# Patient Record
Sex: FEMALE | Race: WHITE | NOT HISPANIC OR LATINO | Employment: OTHER | URBAN - METROPOLITAN AREA
[De-identification: names, ages, dates, MRNs, and addresses within clinical notes are randomized per-mention and may not be internally consistent; named-entity substitution may affect disease eponyms.]

---

## 2017-04-17 ENCOUNTER — OFFICE VISIT (OUTPATIENT)
Dept: CARDIOLOGY | Facility: CLINIC | Age: 72
End: 2017-04-17

## 2017-04-17 VITALS
HEIGHT: 64 IN | BODY MASS INDEX: 21.51 KG/M2 | DIASTOLIC BLOOD PRESSURE: 71 MMHG | WEIGHT: 126 LBS | SYSTOLIC BLOOD PRESSURE: 146 MMHG | HEART RATE: 59 BPM

## 2017-04-17 DIAGNOSIS — Z79.01 ANTICOAGULATED: ICD-10-CM

## 2017-04-17 DIAGNOSIS — E78.5 HYPERLIPIDEMIA, UNSPECIFIED HYPERLIPIDEMIA TYPE: ICD-10-CM

## 2017-04-17 DIAGNOSIS — I48.0 PAROXYSMAL ATRIAL FIBRILLATION (HCC): Primary | ICD-10-CM

## 2017-04-17 DIAGNOSIS — I10 ESSENTIAL HYPERTENSION: ICD-10-CM

## 2017-04-17 PROCEDURE — 99213 OFFICE O/P EST LOW 20 MIN: CPT | Performed by: INTERNAL MEDICINE

## 2017-04-17 PROCEDURE — 93000 ELECTROCARDIOGRAM COMPLETE: CPT | Performed by: INTERNAL MEDICINE

## 2017-04-17 RX ORDER — METOPROLOL TARTRATE 50 MG/1
50 TABLET, FILM COATED ORAL 2 TIMES DAILY
COMMUNITY
End: 2018-03-15 | Stop reason: SDUPTHER

## 2017-04-17 RX ORDER — PANTOPRAZOLE SODIUM 40 MG/1
40 TABLET, DELAYED RELEASE ORAL DAILY
COMMUNITY

## 2017-04-17 RX ORDER — APIXABAN 5 MG/1
5 TABLET, FILM COATED ORAL 2 TIMES DAILY
COMMUNITY
Start: 2017-03-18

## 2017-04-17 RX ORDER — QUETIAPINE FUMARATE 25 MG/1
25 TABLET, FILM COATED ORAL NIGHTLY
COMMUNITY
Start: 2017-04-16 | End: 2018-04-24 | Stop reason: ALTCHOICE

## 2017-04-17 RX ORDER — CHOLECALCIFEROL (VITAMIN D3) 125 MCG
5 CAPSULE ORAL NIGHTLY
COMMUNITY

## 2017-04-17 NOTE — PROGRESS NOTES
Subjective:       Radha Alfred is a 71 y.o. female who here for follow up    CC  Follow-up after the recent stroke  HPI  71 years old female with a past history of the paroxysmal atrial fibrillation, was taken off of the blood thinner for the various reasons, recently had a stroke has been doing well, also developed atrial fibrillation while the patient was in the hospital, also has a history of hyperlipidemia, hypertension and the diabetes, denies any chest pains or tightness in chest no heaviness with a pressure sensation with no syncopal near-syncopal episode     Problem List Items Addressed This Visit     None      Visit Diagnoses     Paroxysmal atrial fibrillation    -  Primary    Relevant Medications    metoprolol tartrate (LOPRESSOR) 50 MG tablet        Previous treatments/evaluations include: . Cardiac risk factors: .    The following portions of the patient's history were reviewed and updated as appropriate: allergies, current medications, past family history, past medical history, past social history, past surgical history and problem list.    Past Medical History:   Diagnosis Date   • Anxiety    • Cerebral infarction    • Chest pain    • Diabetes mellitus     TYPE II   • Edema of lower extremity    • GERD (gastroesophageal reflux disease)    • Hyperlipidemia    • Hypertension    • IBS (irritable bowel syndrome)    • MVP (mitral valve prolapse)    • PVD (peripheral vascular disease)    • Venous thromboembolism     reports that she has quit smoking. She does not have any smokeless tobacco history on file. She reports that she does not drink alcohol or use illicit drugs.  Family History   Problem Relation Age of Onset   • Diabetes Mother    • Heart disease Mother    • Hypertension Mother    • Hyperlipidemia Mother    • COPD Mother    • Heart disease Father    • Hyperlipidemia Father    • Hypertension Father    • Stroke Father    • Diabetes Father        Review of Systems  Constitutional: No wt loss, fever,  "fatigue  Gastrointestinal: No nausea, abdominal pain  Behavioral/Psych: No insomnia or anxiety   Cardiovascular no chest pains or tightness in chest  Objective:       Physical Exam           Physical Exam  /71  Pulse 59  Ht 64\" (162.6 cm)  Wt 126 lb (57.2 kg)  BMI 21.63 kg/m2    General appearance: NAD, conversant   Eyes: anicteric sclerae, moist conjunctivae; no lid-lag; PERRLA   HENT: Atraumatic; oropharynx clear with moist mucous membranes and no mucosal ulcerations;  normal hard and soft palate   Neck: Trachea midline; FROM, supple, no thyromegaly or lymphadenopathy   Lungs: CTA, with normal respiratory effort and no intercostal retractions   CV: S1-S2 regular, sys murmurs, no rub, no gallop   Abdomen: Soft, non-tender; no masses or HSM   Extremities: No peripheral edema or extremity lymphadenopathy  Skin: Normal temperature, turgor and texture; no rash, ulcers or subcutaneous nodules   Psych: Appropriate affect, alert and oriented to person, place and time           Cardiographics  @  ECG 12 Lead  Date/Time: 4/17/2017 3:51 PM  Performed by: CULLEN CHAMBERS  Authorized by: CULLEN CHAMBERS   Comparison: not compared with previous ECG   Previous ECG: no previous ECG available  Rhythm: sinus rhythm  Clinical impression: non-specific ECG            Echocardiogram:        Current Outpatient Prescriptions:   •  amLODIPine (NORVASC) 5 MG tablet, Take 5 mg by mouth., Disp: , Rfl:   •  atorvastatin (LIPITOR) 40 MG tablet, TAKE ONE TABLET BY MOUTH EVERY NIGHT AT BEDTIME, Disp: , Rfl:   •  Blood Glucose Monitoring Suppl (ACURA BLOOD GLUCOSE METER) W/DEVICE kit, 1 each., Disp: , Rfl:   •  cycloSPORINE (RESTASIS) 0.05 % ophthalmic emulsion, 1 drop., Disp: , Rfl:   •  ELIQUIS 5 MG tablet tablet, Take 5 mg by mouth 2 (Two) Times a Day., Disp: , Rfl:   •  hydroxychloroquine (PLAQUENIL) 200 MG tablet, TAKE ONE TABLET BY MOUTH TWICE A DAY, Disp: , Rfl:   •  loperamide (IMODIUM A-D) 2 MG tablet, Take 2 mg by " mouth 4 (four) times a day., Disp: , Rfl:   •  losartan (COZAAR) 50 MG tablet, Take 50 mg by mouth., Disp: , Rfl:   •  meclizine (ANTIVERT) 25 MG tablet, Take 25 mg by mouth 3 (three) times a day., Disp: , Rfl:   •  melatonin 5 MG tablet tablet, Take 5 mg by mouth Every Night., Disp: , Rfl:   •  metoprolol tartrate (LOPRESSOR) 50 MG tablet, Take 50 mg by mouth 2 (Two) Times a Day. 1/2 TABLET EVERY 12 HOURS, Disp: , Rfl:   •  pantoprazole (PROTONIX) 40 MG EC tablet, Take 40 mg by mouth Daily., Disp: , Rfl:   •  promethazine (PHENERGAN) 25 MG tablet, TAKE ONE TABLET BY MOUTH EVERY 6 HOURS AS NEEDED, Disp: , Rfl:   •  Vortioxetine HBr 10 MG tablet, Take 10 mg by mouth., Disp: , Rfl:   •  ALPRAZolam (XANAX) 1 MG tablet, Take 1 mg by mouth daily., Disp: , Rfl:   •  cetirizine (ZyrTEC) 10 MG tablet, Take 10 mg by mouth., Disp: , Rfl:   •  fentaNYL (DURAGESIC) 75 MCG/HR patch, Place  on the skin., Disp: , Rfl:   •  glycopyrrolate (ROBINUL) 1 MG tablet, Take 1 mg by mouth., Disp: , Rfl:   •  lansoprazole (PREVACID) 30 MG capsule, TAKE ONE CAPSULE BY MOUTH DAILY, Disp: , Rfl:   •  oxyCODONE-acetaminophen (PERCOCET)  MG per tablet, Take 1-2 tablets by mouth., Disp: , Rfl:   •  QUEtiapine (SEROquel) 25 MG tablet, Take 25 mg by mouth Every Night. 1/2 PILL AT BEDTIME, Disp: , Rfl:    Assessment:        Patient Active Problem List   Diagnosis   • Altered mental status   • Traumatic amputation of toe   • Anemia   • Anxiety   • Edema of lower extremity   • Bunion   • Cerebral infarction   • Chest pain   • Depression   • Diabetes mellitus associated with genetic syndrome   • Type 2 diabetes mellitus   • Encounter for screening for diabetes mellitus   • Hypertension   • Disorder of foot   • History of cardiac catheterization   • History of surgical procedure   • Displacement of lumbar intervertebral disc without myelopathy   • Heparin-induced thrombocytopenia   • Peripheral vascular disease   • Spinal stenosis   • Urinary tract  bacterial infections   • Small bowel obstruction   • History of cholecystectomy   • Postprocedural state   • Rheumatoid arthritis   • Incisional hernia   • Pain   • Need for vaccination   • Polypharmacy   • Disorder of lipid metabolism   • Memory loss   • Hyperlipidemia         IMPRESSION OF HEART CATHETERIZATION:    1. Normal left main coronary artery.  2. Normal left anterior descending artery and its branches with diagonal   branch 50% ostial  3. Normal left circumflex artery and its branches.  4. Normal right coronary artery.  5. Normal left ventricular systolic function.  LVEDP 30 mmHg.  Ejection   fraction 60 %.    RECOMMENDATION:  Medical management.      Plan:            ICD-10-CM ICD-9-CM   1. Paroxysmal atrial fibrillation I48.0 427.31     1. Paroxysmal atrial fibrillation  At this point patient in normal sinus rhythm, had a stroke couple months ago secondary to atrial fibrillation  - ECG 12 Lead    2. Essential hypertension  Importance of controlling hypertension and blood pressure  checkup on the regular basis has been explained  Hypertension as a silent killer has been discussed  Risk reduction of the weight and regular exercises to control the hypertension has been explained      3. Hyperlipidemia, unspecified hyperlipidemia type  Risk of the hyperlipidemia, importance of the treatment has been explained    Pros and cons of the statins has been explained    Regular blood workup as well as side effects including the liver failure, myelopathy death has been explained          4. Anticoagulated  Pros and cons as well as indication of the anticoagulation has been explained to the patient in detail    There are no obvious complications at this stage    Risk of  the bleedings has been explained    Need for the regular blood workup and adjust the dose has been explained    Need for proper follow-up on anticoagulation also has been explained    RECENT STROKE  WILL CONSIDER WATCHMAN  CON  ELIQUISE    COUNSELING:    Radha Delgado was given to patient for the following topics: diagnostic results, risk factor reductions, impressions, risks and benefits of treatment options and importance of treatment compliance .       SMOKING COUNSELING:    Counseling given: Not Answered      EMR Dragon/Transcription disclaimer:   Much of this encounter note is an electronic transcription/translation of spoken language to printed text. The electronic translation of spoken language may permit erroneous, or at times, nonsensical words or phrases to be inadvertently transcribed; Although I have reviewed the note for such errors, some may still exist.

## 2017-04-20 PROBLEM — Z79.01 ANTICOAGULATED: Status: ACTIVE | Noted: 2017-04-20

## 2018-03-15 ENCOUNTER — OFFICE VISIT (OUTPATIENT)
Dept: CARDIOLOGY | Facility: CLINIC | Age: 73
End: 2018-03-15

## 2018-03-15 VITALS
DIASTOLIC BLOOD PRESSURE: 74 MMHG | HEART RATE: 71 BPM | SYSTOLIC BLOOD PRESSURE: 147 MMHG | WEIGHT: 142.2 LBS | HEIGHT: 65 IN | BODY MASS INDEX: 23.69 KG/M2

## 2018-03-15 DIAGNOSIS — E78.5 HYPERLIPIDEMIA, UNSPECIFIED HYPERLIPIDEMIA TYPE: ICD-10-CM

## 2018-03-15 DIAGNOSIS — I73.9 PERIPHERAL VASCULAR DISEASE (HCC): ICD-10-CM

## 2018-03-15 DIAGNOSIS — R09.89 OTHER SPECIFIED SYMPTOMS AND SIGNS INVOLVING THE CIRCULATORY AND RESPIRATORY SYSTEMS: ICD-10-CM

## 2018-03-15 DIAGNOSIS — I77.9 CAROTID ARTERY DISEASE, UNSPECIFIED LATERALITY (HCC): Primary | ICD-10-CM

## 2018-03-15 DIAGNOSIS — R07.2 PRECORDIAL PAIN: ICD-10-CM

## 2018-03-15 DIAGNOSIS — I10 ESSENTIAL HYPERTENSION: ICD-10-CM

## 2018-03-15 PROCEDURE — 99213 OFFICE O/P EST LOW 20 MIN: CPT | Performed by: INTERNAL MEDICINE

## 2018-03-15 PROCEDURE — 93000 ELECTROCARDIOGRAM COMPLETE: CPT | Performed by: INTERNAL MEDICINE

## 2018-03-15 NOTE — PROGRESS NOTES
Subjective:       Radha Alfred is a 72 y.o. female who here for follow up    CC  BLURRED EYE    HPI  72-year-old female with known history of benign essential arterial hypertension, peripheral vascular disease hyperlipidemia and coronary artery disease here for the follow-up has been complaining of the blurred vision off-and-on mild to moderate in intensity, no chest pains or tightness in chest no heaviness with a pressure sensation     Problem List Items Addressed This Visit        Cardiovascular and Mediastinum    Hypertension    Peripheral vascular disease    Hyperlipidemia       Nervous and Auditory    Chest pain    Relevant Orders    Stress Test With Myocardial Perfusion One Day    Adult Transthoracic Echo Complete W/ Cont if Necessary Per Protocol      Other Visit Diagnoses     Carotid artery disease, unspecified laterality    -  Primary    Relevant Orders    Stress Test With Myocardial Perfusion One Day    Adult Transthoracic Echo Complete W/ Cont if Necessary Per Protocol    Duplex Carotid Ultrasound CAR    Other specified symptoms and signs involving the circulatory and respiratory systems         Relevant Orders    Duplex Carotid Ultrasound CAR        .    The following portions of the patient's history were reviewed and updated as appropriate: allergies, current medications, past family history, past medical history, past social history, past surgical history and problem list.    Past Medical History:   Diagnosis Date   • Anxiety    • Cerebral infarction    • Chest pain    • Diabetes mellitus     TYPE II   • Edema of lower extremity    • GERD (gastroesophageal reflux disease)    • Hyperlipidemia    • Hypertension    • IBS (irritable bowel syndrome)    • MVP (mitral valve prolapse)    • PVD (peripheral vascular disease)    • Venous thromboembolism     reports that she has quit smoking. She has never used smokeless tobacco. She reports that she does not drink alcohol or use drugs.  Family History  "  Problem Relation Age of Onset   • Diabetes Mother    • Heart disease Mother    • Hypertension Mother    • Hyperlipidemia Mother    • COPD Mother    • Heart disease Father    • Hyperlipidemia Father    • Hypertension Father    • Stroke Father    • Diabetes Father        Review of Systems  Constitutional: No wt loss, fever, fatigue  Gastrointestinal: No nausea, abdominal pain  Behavioral/Psych: No insomnia or anxiety   Cardiovascular No chest pains or tightness in chest  Objective:       Physical Exam           Physical Exam  /74   Pulse 71   Ht 165.1 cm (65\")   Wt 64.5 kg (142 lb 3.2 oz)   BMI 23.66 kg/m²     General appearance: NAD, conversant   Eyes: anicteric sclerae, moist conjunctivae; no lid-lag; PERRLA   HENT: Atraumatic; oropharynx clear with moist mucous membranes and no mucosal ulcerations;  normal hard and soft palate   Neck: Trachea midline; FROM, supple, no thyromegaly or lymphadenopathy   Lungs: CTA, with normal respiratory effort and no intercostal retractions   CV: S1-S2 regular, no murmurs, no rub, no gallop   Abdomen: Soft, non-tender; no masses or HSM   Extremities: No peripheral edema or extremity lymphadenopathy  Skin: Normal temperature, turgor and texture; no rash, ulcers or subcutaneous nodules   Psych: Appropriate affect, alert and oriented to person, place and time           Cardiographics  @  ECG 12 Lead  Date/Time: 3/15/2018 1:06 PM  Performed by: CULLEN CHAMBERS  Authorized by: CULLEN CHAMBERS   Comparison: compared with previous ECG   Similar to previous ECG  Rhythm: sinus rhythm  ST Flattening: all  Clinical impression: non-specific ECG            Echocardiogram:        Current Outpatient Prescriptions:   •  amLODIPine (NORVASC) 5 MG tablet, Take 5 mg by mouth., Disp: , Rfl:   •  atorvastatin (LIPITOR) 40 MG tablet, TAKE ONE TABLET BY MOUTH EVERY NIGHT AT BEDTIME, Disp: , Rfl:   •  cycloSPORINE (RESTASIS) 0.05 % ophthalmic emulsion, 1 drop., Disp: , Rfl:   •  " ELIQUIS 5 MG tablet tablet, Take 5 mg by mouth 2 (Two) Times a Day., Disp: , Rfl:   •  hydroxychloroquine (PLAQUENIL) 200 MG tablet, TAKE ONE TABLET BY MOUTH TWICE A DAY, Disp: , Rfl:   •  loperamide (IMODIUM A-D) 2 MG tablet, Take 2 mg by mouth 4 (four) times a day., Disp: , Rfl:   •  losartan (COZAAR) 50 MG tablet, Take 50 mg by mouth., Disp: , Rfl:   •  melatonin 5 MG tablet tablet, Take 5 mg by mouth Every Night., Disp: , Rfl:   •  metoprolol tartrate (LOPRESSOR) 25 MG tablet, Take 1 tablet by mouth 2 (Two) Times a Day., Disp: 60 tablet, Rfl: 11  •  pantoprazole (PROTONIX) 40 MG EC tablet, Take 40 mg by mouth Daily., Disp: , Rfl:   •  QUEtiapine (SEROquel) 25 MG tablet, Take 25 mg by mouth Every Night. 1/2 PILL AT BEDTIME, Disp: , Rfl:   •  Vortioxetine HBr 10 MG tablet, Take 10 mg by mouth., Disp: , Rfl:   •  Blood Glucose Monitoring Suppl (ACURA BLOOD GLUCOSE METER) W/DEVICE kit, 1 each., Disp: , Rfl:   •  glycopyrrolate (ROBINUL) 1 MG tablet, Take 1 mg by mouth., Disp: , Rfl:    Assessment:        Patient Active Problem List   Diagnosis   • Altered mental status   • Traumatic amputation of toe   • Anemia   • Anxiety   • Edema of lower extremity   • Bunion   • Cerebral infarction   • Chest pain   • Depression   • Diabetes mellitus associated with genetic syndrome   • Type 2 diabetes mellitus   • Encounter for screening for diabetes mellitus   • Hypertension   • Disorder of foot   • History of cardiac catheterization   • History of surgical procedure   • Displacement of lumbar intervertebral disc without myelopathy   • Heparin-induced thrombocytopenia   • Peripheral vascular disease   • Spinal stenosis   • Urinary tract bacterial infections   • Small bowel obstruction   • History of cholecystectomy   • Postprocedural state   • Rheumatoid arthritis   • Incisional hernia   • Pain   • Need for vaccination   • Polypharmacy   • Disorder of lipid metabolism   • Memory loss   • Hyperlipidemia   • Anticoagulated          CHOL 133      Plan:            ICD-10-CM ICD-9-CM   1. Carotid artery disease, unspecified laterality I77.9 447.9   2. Precordial pain  R07.2 786.51   3. Other specified symptoms and signs involving the circulatory and respiratory systems  R09.89 785.9   4. Essential hypertension I10 401.9   5. Peripheral vascular disease I73.9 443.9   6. Hyperlipidemia, unspecified hyperlipidemia type E78.5 272.4     1. Carotid artery disease, unspecified laterality    - Stress Test With Myocardial Perfusion One Day  - Adult Transthoracic Echo Complete W/ Cont if Necessary Per Protocol  - Duplex Carotid Ultrasound CAR    2. Precordial pain   Considering the patient's symptoms as well as clinical situation and  EKG findings, along with cardiac risk factors, ischemic workup is necessary to rule out ischemic cardiomyopathy, stress induced arrhythmias, and functional capacity for diagnosis as well as prognostic consideration    - Stress Test With Myocardial Perfusion One Day  - Adult Transthoracic Echo Complete W/ Cont if Necessary Per Protocol    3. Other specified symptoms and signs involving the circulatory and respiratory systems   Considering patient's medical condition as well as the risk factors, patient will require echocardiogram for further evaluation for the LV function, four-chamber evaluation, including the pressures, valvular function and  pericardial disease and pericardial effusion    - Duplex Carotid Ultrasound CAR    4. Essential hypertension  The blood pressure borderline high    5. Peripheral vascular disease  Occasional leg pains    6. Hyperlipidemia, unspecified hyperlipidemia type       LEXISCAN/ ECHO/ US OF CAROTIDS    COUNSELING:    Radha Delgado was given to patient for the following topics: diagnostic results, risk factor reductions, impressions, risks and benefits of treatment options and importance of treatment compliance .       SMOKING COUNSELING:    Counseling given: Not Answered      EMR  Dragon/Transcription disclaimer:   Much of this encounter note is an electronic transcription/translation of spoken language to printed text. The electronic translation of spoken language may permit erroneous, or at times, nonsensical words or phrases to be inadvertently transcribed; Although I have reviewed the note for such errors, some may still exist.

## 2018-03-28 ENCOUNTER — APPOINTMENT (OUTPATIENT)
Dept: CARDIOLOGY | Facility: HOSPITAL | Age: 73
End: 2018-03-28
Attending: INTERNAL MEDICINE

## 2018-03-30 ENCOUNTER — APPOINTMENT (OUTPATIENT)
Dept: CARDIOLOGY | Facility: HOSPITAL | Age: 73
End: 2018-03-30
Attending: INTERNAL MEDICINE

## 2018-04-09 ENCOUNTER — HOSPITAL ENCOUNTER (OUTPATIENT)
Dept: CARDIOLOGY | Facility: HOSPITAL | Age: 73
Discharge: HOME | End: 2018-04-09
Attending: INTERNAL MEDICINE | Admitting: INTERNAL MEDICINE

## 2018-04-09 ENCOUNTER — HOSPITAL ENCOUNTER (OUTPATIENT)
Dept: CARDIOLOGY | Facility: HOSPITAL | Age: 73
Discharge: HOME | End: 2018-04-09
Attending: INTERNAL MEDICINE

## 2018-04-09 LAB
BH CV XLRA MEAS LEFT DIST CCA EDV: 13 CM/SEC
BH CV XLRA MEAS LEFT DIST CCA PSV: 56.4 CM/SEC
BH CV XLRA MEAS LEFT DIST ICA EDV: -15.8 CM/SEC
BH CV XLRA MEAS LEFT DIST ICA PSV: -104 CM/SEC
BH CV XLRA MEAS LEFT MID ICA EDV: 29.2 CM/SEC
BH CV XLRA MEAS LEFT MID ICA PSV: 111.1 CM/SEC
BH CV XLRA MEAS LEFT PROX CCA EDV: 9.3 CM/SEC
BH CV XLRA MEAS LEFT PROX CCA PSV: 52.9 CM/SEC
BH CV XLRA MEAS LEFT PROX ECA EDV: 10.9 CM/SEC
BH CV XLRA MEAS LEFT PROX ECA PSV: 117.1 CM/SEC
BH CV XLRA MEAS LEFT PROX ICA EDV: 15 CM/SEC
BH CV XLRA MEAS LEFT PROX ICA PSV: 81 CM/SEC
BH CV XLRA MEAS LEFT PROX SCLA PSV: 107.1 CM/SEC
BH CV XLRA MEAS LEFT VERTEBRAL A EDV: 9.9 CM/SEC
BH CV XLRA MEAS LEFT VERTEBRAL A PSV: 65.2 CM/SEC
BH CV XLRA MEAS RIGHT DIST CCA EDV: 16.6 CM/SEC
BH CV XLRA MEAS RIGHT DIST CCA PSV: 64.5 CM/SEC
BH CV XLRA MEAS RIGHT DIST ICA EDV: -18.9 CM/SEC
BH CV XLRA MEAS RIGHT DIST ICA PSV: -95.7 CM/SEC
BH CV XLRA MEAS RIGHT MID ICA EDV: -26.5 CM/SEC
BH CV XLRA MEAS RIGHT MID ICA PSV: -109 CM/SEC
BH CV XLRA MEAS RIGHT PROX CCA EDV: 11.1 CM/SEC
BH CV XLRA MEAS RIGHT PROX CCA PSV: 70.8 CM/SEC
BH CV XLRA MEAS RIGHT PROX ECA EDV: 11.1 CM/SEC
BH CV XLRA MEAS RIGHT PROX ECA PSV: 125.1 CM/SEC
BH CV XLRA MEAS RIGHT PROX ICA EDV: 16.6 CM/SEC
BH CV XLRA MEAS RIGHT PROX ICA PSV: 77.1 CM/SEC
BH CV XLRA MEAS RIGHT PROX SCLA PSV: 143.1 CM/SEC
BH CV XLRA MEAS RIGHT VERTEBRAL A EDV: -14.2 CM/SEC
BH CV XLRA MEAS RIGHT VERTEBRAL A PSV: -78.7 CM/SEC
LEFT ARM BP: NORMAL MMHG
RIGHT ARM BP: NORMAL MMHG

## 2018-04-09 PROCEDURE — 93880 EXTRACRANIAL BILAT STUDY: CPT

## 2018-04-09 PROCEDURE — 93306 TTE W/DOPPLER COMPLETE: CPT | Performed by: INTERNAL MEDICINE

## 2018-04-09 PROCEDURE — 93306 TTE W/DOPPLER COMPLETE: CPT

## 2018-04-10 LAB
AORTIC DIMENSIONLESS INDEX: 0.8 (DI)
BH CV ECHO MEAS - ACS: 1.6 CM
BH CV ECHO MEAS - AO MAX PG (FULL): 3.4 MMHG
BH CV ECHO MEAS - AO MAX PG: 6.7 MMHG
BH CV ECHO MEAS - AO MEAN PG (FULL): 1 MMHG
BH CV ECHO MEAS - AO MEAN PG: 3 MMHG
BH CV ECHO MEAS - AO ROOT AREA (BSA CORRECTED): 1.6
BH CV ECHO MEAS - AO ROOT AREA: 6.2 CM^2
BH CV ECHO MEAS - AO ROOT DIAM: 2.8 CM
BH CV ECHO MEAS - AO V2 MAX: 129 CM/SEC
BH CV ECHO MEAS - AO V2 MEAN: 85.3 CM/SEC
BH CV ECHO MEAS - AO V2 VTI: 27.6 CM
BH CV ECHO MEAS - AVA(I,A): 2.2 CM^2
BH CV ECHO MEAS - AVA(I,D): 2.2 CM^2
BH CV ECHO MEAS - AVA(V,A): 2 CM^2
BH CV ECHO MEAS - AVA(V,D): 2 CM^2
BH CV ECHO MEAS - BSA(HAYCOCK): 1.7 M^2
BH CV ECHO MEAS - BSA: 1.7 M^2
BH CV ECHO MEAS - BZI_BMI: 23.6 KILOGRAMS/M^2
BH CV ECHO MEAS - BZI_METRIC_HEIGHT: 165.1 CM
BH CV ECHO MEAS - BZI_METRIC_WEIGHT: 64.4 KG
BH CV ECHO MEAS - CONTRAST EF (2CH): 62.9 ML/M^2
BH CV ECHO MEAS - CONTRAST EF 4CH: 66 ML/M^2
BH CV ECHO MEAS - EDV(CUBED): 68.9 ML
BH CV ECHO MEAS - EDV(MOD-SP2): 62 ML
BH CV ECHO MEAS - EDV(MOD-SP4): 50 ML
BH CV ECHO MEAS - EDV(TEICH): 74.2 ML
BH CV ECHO MEAS - EF(CUBED): 74.5 %
BH CV ECHO MEAS - EF(MOD-SP2): 62.9 %
BH CV ECHO MEAS - EF(MOD-SP4): 66 %
BH CV ECHO MEAS - EF(TEICH): 66.8 %
BH CV ECHO MEAS - ESV(CUBED): 17.6 ML
BH CV ECHO MEAS - ESV(MOD-SP2): 23 ML
BH CV ECHO MEAS - ESV(MOD-SP4): 17 ML
BH CV ECHO MEAS - ESV(TEICH): 24.6 ML
BH CV ECHO MEAS - FS: 36.6 %
BH CV ECHO MEAS - IVS/LVPW: 1.2
BH CV ECHO MEAS - IVSD: 1.4 CM
BH CV ECHO MEAS - LAT PEAK E' VEL: 8 CM/SEC
BH CV ECHO MEAS - LV DIASTOLIC VOL/BSA (35-75): 29.2 ML/M^2
BH CV ECHO MEAS - LV MASS(C)D: 193.5 GRAMS
BH CV ECHO MEAS - LV MASS(C)DI: 113.1 GRAMS/M^2
BH CV ECHO MEAS - LV MAX PG: 3.3 MMHG
BH CV ECHO MEAS - LV MEAN PG: 2 MMHG
BH CV ECHO MEAS - LV SYSTOLIC VOL/BSA (12-30): 9.9 ML/M^2
BH CV ECHO MEAS - LV V1 MAX: 90.2 CM/SEC
BH CV ECHO MEAS - LV V1 MEAN: 59.2 CM/SEC
BH CV ECHO MEAS - LV V1 VTI: 21 CM
BH CV ECHO MEAS - LVIDD: 4.1 CM
BH CV ECHO MEAS - LVIDS: 2.6 CM
BH CV ECHO MEAS - LVLD AP2: 6.1 CM
BH CV ECHO MEAS - LVLD AP4: 6.7 CM
BH CV ECHO MEAS - LVLS AP2: 5.1 CM
BH CV ECHO MEAS - LVLS AP4: 5.6 CM
BH CV ECHO MEAS - LVOT AREA (M): 2.8 CM^2
BH CV ECHO MEAS - LVOT AREA: 2.8 CM^2
BH CV ECHO MEAS - LVOT DIAM: 1.9 CM
BH CV ECHO MEAS - LVPWD: 1.2 CM
BH CV ECHO MEAS - MED PEAK E' VEL: 8 CM/SEC
BH CV ECHO MEAS - MR MAX PG: 100.8 MMHG
BH CV ECHO MEAS - MR MAX VEL: 502 CM/SEC
BH CV ECHO MEAS - MV A DUR: 1.9 SEC
BH CV ECHO MEAS - MV A MAX VEL: 47.1 CM/SEC
BH CV ECHO MEAS - MV DEC SLOPE: 1110 CM/SEC^2
BH CV ECHO MEAS - MV DEC TIME: 0.12 SEC
BH CV ECHO MEAS - MV E MAX VEL: 132 CM/SEC
BH CV ECHO MEAS - MV E/A: 2.8
BH CV ECHO MEAS - MV MAX PG: 7.8 MMHG
BH CV ECHO MEAS - MV MEAN PG: 2 MMHG
BH CV ECHO MEAS - MV P1/2T MAX VEL: 149 CM/SEC
BH CV ECHO MEAS - MV P1/2T: 39.3 MSEC
BH CV ECHO MEAS - MV V2 MAX: 140 CM/SEC
BH CV ECHO MEAS - MV V2 MEAN: 66.5 CM/SEC
BH CV ECHO MEAS - MV V2 VTI: 25.9 CM
BH CV ECHO MEAS - MVA P1/2T LCG: 1.5 CM^2
BH CV ECHO MEAS - MVA(P1/2T): 5.6 CM^2
BH CV ECHO MEAS - MVA(VTI): 2.3 CM^2
BH CV ECHO MEAS - PA ACC TIME: 0.13 SEC
BH CV ECHO MEAS - PA MAX PG (FULL): 0.42 MMHG
BH CV ECHO MEAS - PA MAX PG: 3.2 MMHG
BH CV ECHO MEAS - PA PR(ACCEL): 21 MMHG
BH CV ECHO MEAS - PA V2 MAX: 89.6 CM/SEC
BH CV ECHO MEAS - PULM A REVS DUR: 0.5 SEC
BH CV ECHO MEAS - PULM A REVS VEL: 27.6 CM/SEC
BH CV ECHO MEAS - PULM DIAS VEL: 60.6 CM/SEC
BH CV ECHO MEAS - PULM S/D: 0.37
BH CV ECHO MEAS - PULM SYS VEL: 22.3 CM/SEC
BH CV ECHO MEAS - PVA(V,A): 2.4 CM^2
BH CV ECHO MEAS - PVA(V,D): 2.4 CM^2
BH CV ECHO MEAS - QP/QS: 0.91
BH CV ECHO MEAS - RAP SYSTOLE: 8 MMHG
BH CV ECHO MEAS - RV MAX PG: 2.8 MMHG
BH CV ECHO MEAS - RV MEAN PG: 2 MMHG
BH CV ECHO MEAS - RV V1 MAX: 83.5 CM/SEC
BH CV ECHO MEAS - RV V1 MEAN: 64.6 CM/SEC
BH CV ECHO MEAS - RV V1 VTI: 21.3 CM
BH CV ECHO MEAS - RVOT AREA: 2.5 CM^2
BH CV ECHO MEAS - RVOT DIAM: 1.8 CM
BH CV ECHO MEAS - RVSP: 50 MMHG
BH CV ECHO MEAS - SI(AO): 99.4 ML/M^2
BH CV ECHO MEAS - SI(CUBED): 30 ML/M^2
BH CV ECHO MEAS - SI(LVOT): 34.8 ML/M^2
BH CV ECHO MEAS - SI(MOD-SP2): 22.8 ML/M^2
BH CV ECHO MEAS - SI(MOD-SP4): 19.3 ML/M^2
BH CV ECHO MEAS - SI(TEICH): 29 ML/M^2
BH CV ECHO MEAS - SV(AO): 169.9 ML
BH CV ECHO MEAS - SV(CUBED): 51.3 ML
BH CV ECHO MEAS - SV(LVOT): 59.5 ML
BH CV ECHO MEAS - SV(MOD-SP2): 39 ML
BH CV ECHO MEAS - SV(MOD-SP4): 33 ML
BH CV ECHO MEAS - SV(RVOT): 54.2 ML
BH CV ECHO MEAS - SV(TEICH): 49.6 ML
BH CV ECHO MEAS - TAPSE (>1.6): 2.2 CM2
BH CV ECHO MEAS - TR MAX VEL: 323 CM/SEC
BH CV VAS BP LEFT ARM: NORMAL MMHG
BH CV XLRA - RV BASE: 3.8 CM
BH CV XLRA - TDI S': 16 CM/SEC
E/E' RATIO: 16.5
LEFT ATRIUM VOLUME INDEX: 31.8 ML/M2
MAXIMAL PREDICTED HEART RATE: 147 BPM
STRESS TARGET HR: 125 BPM

## 2018-04-16 ENCOUNTER — OFFICE VISIT (OUTPATIENT)
Dept: CARDIOLOGY | Facility: CLINIC | Age: 73
End: 2018-04-16

## 2018-04-16 VITALS
BODY MASS INDEX: 25.16 KG/M2 | SYSTOLIC BLOOD PRESSURE: 148 MMHG | WEIGHT: 142 LBS | HEART RATE: 60 BPM | HEIGHT: 63 IN | OXYGEN SATURATION: 96 % | DIASTOLIC BLOOD PRESSURE: 65 MMHG

## 2018-04-16 DIAGNOSIS — R06.09 OTHER FORM OF DYSPNEA: ICD-10-CM

## 2018-04-16 DIAGNOSIS — R93.1 ABNORMAL ECHOCARDIOGRAM: Primary | ICD-10-CM

## 2018-04-16 DIAGNOSIS — E78.5 HYPERLIPIDEMIA, UNSPECIFIED HYPERLIPIDEMIA TYPE: ICD-10-CM

## 2018-04-16 DIAGNOSIS — I10 ESSENTIAL HYPERTENSION: ICD-10-CM

## 2018-04-16 PROCEDURE — 99213 OFFICE O/P EST LOW 20 MIN: CPT | Performed by: INTERNAL MEDICINE

## 2018-04-16 NOTE — PROGRESS NOTES
Subjective:       Radha Alfred is a 72 y.o. female who here for follow up    CC  Follow-up for the hypertension hyperlipidemia  HPI  72-year-old female with known history of benign essential arterial hypertension hyperlipidemia and abnormal EKG here for the follow-up  No chest pains or tightness in chest shortness of breath on exertion no different than before     Problem List Items Addressed This Visit        Cardiovascular and Mediastinum    Hypertension    Hyperlipidemia      Other Visit Diagnoses     Abnormal echocardiogram    -  Primary    Relevant Orders    Stress Test With Myocardial Perfusion One Day (Completed)    Other form of dyspnea         Relevant Orders    Stress Test With Myocardial Perfusion One Day (Completed)        .    The following portions of the patient's history were reviewed and updated as appropriate: allergies, current medications, past family history, past medical history, past social history, past surgical history and problem list.    Past Medical History:   Diagnosis Date   • Anxiety    • Cerebral infarction    • Chest pain    • Diabetes mellitus     TYPE II   • Edema of lower extremity    • GERD (gastroesophageal reflux disease)    • Hyperlipidemia    • Hypertension    • IBS (irritable bowel syndrome)    • MVP (mitral valve prolapse)    • PVD (peripheral vascular disease)    • Venous thromboembolism     reports that she has quit smoking. She has never used smokeless tobacco. She reports that she does not drink alcohol or use drugs.  Family History   Problem Relation Age of Onset   • Diabetes Mother    • Heart disease Mother    • Hypertension Mother    • Hyperlipidemia Mother    • COPD Mother    • Heart disease Father    • Hyperlipidemia Father    • Hypertension Father    • Stroke Father    • Diabetes Father        Review of Systems  Constitutional: No wt loss, fever, fatigue  Gastrointestinal: No nausea, abdominal pain  Behavioral/Psych: No insomnia or anxiety   Cardiovascular No  "chest pain  Objective:       Physical Exam           Physical Exam  /65   Pulse 60   Ht 160 cm (63\")   Wt 64.4 kg (142 lb)   SpO2 96%   BMI 25.15 kg/m²     General appearance: NAD, conversant   Eyes: anicteric sclerae, moist conjunctivae; no lid-lag; PERRLA   HENT: Atraumatic; oropharynx clear with moist mucous membranes and no mucosal ulcerations;  normal hard and soft palate   Neck: Trachea midline; FROM, supple, no thyromegaly or lymphadenopathy   Lungs: CTA, with normal respiratory effort and no intercostal retractions   CV: S1-S2 regular, no murmurs, no rub, no gallop   Abdomen: Soft, non-tender; no masses or HSM   Extremities: No peripheral edema or extremity lymphadenopathy  Skin: Normal temperature, turgor and texture; no rash, ulcers or subcutaneous nodules   Psych: Appropriate affect, alert and oriented to person, place and time           Cardiographics  @Procedures    Echocardiogram:        Current Outpatient Prescriptions:   •  amLODIPine (NORVASC) 5 MG tablet, Take 5 mg by mouth., Disp: , Rfl:   •  atorvastatin (LIPITOR) 40 MG tablet, TAKE ONE TABLET BY MOUTH EVERY NIGHT AT BEDTIME, Disp: , Rfl:   •  Blood Glucose Monitoring Suppl (ACURA BLOOD GLUCOSE METER) W/DEVICE kit, 1 each., Disp: , Rfl:   •  cycloSPORINE (RESTASIS) 0.05 % ophthalmic emulsion, 1 drop., Disp: , Rfl:   •  ELIQUIS 5 MG tablet tablet, Take 5 mg by mouth 2 (Two) Times a Day., Disp: , Rfl:   •  glycopyrrolate (ROBINUL) 1 MG tablet, Take 1 mg by mouth., Disp: , Rfl:   •  hydroxychloroquine (PLAQUENIL) 200 MG tablet, TAKE ONE TABLET BY MOUTH TWICE A DAY, Disp: , Rfl:   •  loperamide (IMODIUM A-D) 2 MG tablet, Take 2 mg by mouth 4 (four) times a day., Disp: , Rfl:   •  losartan (COZAAR) 50 MG tablet, Take 50 mg by mouth., Disp: , Rfl:   •  melatonin 5 MG tablet tablet, Take 5 mg by mouth Every Night., Disp: , Rfl:   •  metoprolol tartrate (LOPRESSOR) 25 MG tablet, Take 1 tablet by mouth 2 (Two) Times a Day., Disp: 60 tablet, " Rfl: 11  •  pantoprazole (PROTONIX) 40 MG EC tablet, Take 40 mg by mouth Daily., Disp: , Rfl:   •  QUEtiapine (SEROquel) 25 MG tablet, Take 25 mg by mouth Every Night. 1/2 PILL AT BEDTIME, Disp: , Rfl:   •  Vortioxetine HBr 10 MG tablet, Take 10 mg by mouth., Disp: , Rfl:    Assessment:        Patient Active Problem List   Diagnosis   • Altered mental status   • Traumatic amputation of toe   • Anemia   • Anxiety   • Edema of lower extremity   • Bunion   • Cerebral infarction   • Chest pain   • Depression   • Diabetes mellitus associated with genetic syndrome   • Type 2 diabetes mellitus   • Encounter for screening for diabetes mellitus   • Hypertension   • Disorder of foot   • History of cardiac catheterization   • History of surgical procedure   • Displacement of lumbar intervertebral disc without myelopathy   • Heparin-induced thrombocytopenia   • Peripheral vascular disease   • Spinal stenosis   • Urinary tract bacterial infections   • Small bowel obstruction   • History of cholecystectomy   • Postprocedural state   • Rheumatoid arthritis   • Incisional hernia   • Pain   • Need for vaccination   • Polypharmacy   • Disorder of lipid metabolism   • Memory loss   • Hyperlipidemia   • Anticoagulated         Interpretation Summary     · Calculated right ventricular systolic pressure from tricuspid regurgitation is 50 mmHg.  · Left ventricular wall thickness is consistent with mild concentric hypertrophy.  · Left atrial cavity size is mildly dilated.  · There is calcification of the aortic valve.  · Mild aortic valve regurgitation is present.  · Mild mitral valve regurgitation is present  · Mild tricuspid valve regurgitation is present.  · Mild pulmonic valve regurgitation is present.  · Calculated EF = 66%  · There is no evidence of pericardial effusion     Interpretation Summary     · Mid right internal carotid artery mild stenosis.  · Mid left internal carotid artery mild stenosis.              Plan:             ICD-10-CM ICD-9-CM   1. Abnormal echocardiogram R93.1 793.2   2. Other form of dyspnea  R06.09 786.09   3. Essential hypertension I10 401.9   4. Hyperlipidemia, unspecified hyperlipidemia type E78.5 272.4     1. Abnormal echocardiogram  Considering the patient's symptoms as well as clinical situation and  EKG findings, along with cardiac risk factors, ischemic workup is necessary to rule out ischemic cardiomyopathy, stress induced arrhythmias, and functional capacity for diagnosis as well as prognostic consideration    - Stress Test With Myocardial Perfusion One Day    2. Other form of dyspnea   Multifactorial  - Stress Test With Myocardial Perfusion One Day    3. Essential hypertension  Blood pressure under control    4. Hyperlipidemia, unspecified hyperlipidemia type  Counseling has been done       LEXISCAN CARDIOLYTE      SEE US 2-4 WKS  COUNSELING:    Radha Delgado was given to patient for the following topics: diagnostic results, risk factor reductions, impressions, risks and benefits of treatment options and importance of treatment compliance .       SMOKING COUNSELING:    Counseling given: Yes      EMR Dragon/Transcription disclaimer:   Much of this encounter note is an electronic transcription/translation of spoken language to printed text. The electronic translation of spoken language may permit erroneous, or at times, nonsensical words or phrases to be inadvertently transcribed; Although I have reviewed the note for such errors, some may still exist.

## 2018-04-17 ENCOUNTER — HOSPITAL ENCOUNTER (OUTPATIENT)
Dept: CARDIOLOGY | Facility: HOSPITAL | Age: 73
Discharge: HOME | End: 2018-04-17
Attending: INTERNAL MEDICINE

## 2018-04-17 VITALS
SYSTOLIC BLOOD PRESSURE: 155 MMHG | OXYGEN SATURATION: 100 % | DIASTOLIC BLOOD PRESSURE: 65 MMHG | HEART RATE: 61 BPM | RESPIRATION RATE: 18 BRPM | WEIGHT: 140 LBS | HEIGHT: 63 IN | BODY MASS INDEX: 24.8 KG/M2

## 2018-04-17 PROCEDURE — 25010000002 AMINOPHYLLINE PER 250 MG: Performed by: INTERNAL MEDICINE

## 2018-04-17 PROCEDURE — 78452 HT MUSCLE IMAGE SPECT MULT: CPT

## 2018-04-17 PROCEDURE — 93017 CV STRESS TEST TRACING ONLY: CPT

## 2018-04-17 PROCEDURE — 0 TECHNETIUM SESTAMIBI: Performed by: INTERNAL MEDICINE

## 2018-04-17 PROCEDURE — 93016 CV STRESS TEST SUPVJ ONLY: CPT | Performed by: INTERNAL MEDICINE

## 2018-04-17 PROCEDURE — A9500 TC99M SESTAMIBI: HCPCS | Performed by: INTERNAL MEDICINE

## 2018-04-17 PROCEDURE — 25010000002 REGADENOSON 0.4 MG/5ML SOLUTION: Performed by: INTERNAL MEDICINE

## 2018-04-17 PROCEDURE — 93018 CV STRESS TEST I&R ONLY: CPT | Performed by: INTERNAL MEDICINE

## 2018-04-17 PROCEDURE — 78452 HT MUSCLE IMAGE SPECT MULT: CPT | Performed by: INTERNAL MEDICINE

## 2018-04-17 RX ORDER — AMINOPHYLLINE DIHYDRATE 25 MG/ML
125 INJECTION, SOLUTION INTRAVENOUS ONCE
Status: COMPLETED | OUTPATIENT
Start: 2018-04-17 | End: 2018-04-17

## 2018-04-17 RX ADMIN — TECHNETIUM TC 99M SESTAMIBI 1 DOSE: 1 INJECTION INTRAVENOUS at 08:31

## 2018-04-17 RX ADMIN — TECHNETIUM TC 99M SESTAMIBI 1 DOSE: 1 INJECTION INTRAVENOUS at 12:06

## 2018-04-17 RX ADMIN — REGADENOSON 0.4 MG: 0.08 INJECTION, SOLUTION INTRAVENOUS at 12:07

## 2018-04-17 RX ADMIN — AMINOPHYLLINE 125 MG: 25 INJECTION, SOLUTION INTRAVENOUS at 12:09

## 2018-04-18 LAB
BH CV STRESS BP STAGE 1: NORMAL
BH CV STRESS COMMENTS STAGE 1: NORMAL
BH CV STRESS DOSE REGADENOSON STAGE 1: 0.4
BH CV STRESS DURATION MIN STAGE 1: 2
BH CV STRESS DURATION SEC STAGE 1: 17
BH CV STRESS GRADE STAGE 1: 0
BH CV STRESS HR STAGE 1: 96
BH CV STRESS METS STAGE 1: 1.4
BH CV STRESS O2 STAGE 1: 100
BH CV STRESS PROTOCOL 1: NORMAL
BH CV STRESS RECOVERY BP: NORMAL MMHG
BH CV STRESS RECOVERY HR: 71 BPM
BH CV STRESS RECOVERY O2: 100 %
BH CV STRESS SPEED STAGE 1: 1
BH CV STRESS STAGE 1: 1
LV EF NUC BP: 79 %
MAXIMAL PREDICTED HEART RATE: 147 BPM
PERCENT MAX PREDICTED HR: 65.31 %
STRESS BASELINE BP: NORMAL MMHG
STRESS BASELINE HR: 72 BPM
STRESS O2 SAT REST: 100 %
STRESS PERCENT HR: 77 %
STRESS POST ESTIMATED WORKLOAD: 1.4 METS
STRESS POST EXERCISE DUR MIN: 2 MIN
STRESS POST EXERCISE DUR SEC: 17 SEC
STRESS POST O2 SAT PEAK: 100 %
STRESS POST PEAK BP: NORMAL MMHG
STRESS POST PEAK HR: 96 BPM
STRESS TARGET HR: 125 BPM

## 2018-04-24 ENCOUNTER — OFFICE VISIT (OUTPATIENT)
Dept: CARDIOLOGY | Facility: CLINIC | Age: 73
End: 2018-04-24

## 2018-04-24 VITALS
DIASTOLIC BLOOD PRESSURE: 86 MMHG | HEART RATE: 57 BPM | WEIGHT: 143 LBS | SYSTOLIC BLOOD PRESSURE: 148 MMHG | HEIGHT: 63 IN | BODY MASS INDEX: 25.34 KG/M2

## 2018-04-24 DIAGNOSIS — Z79.01 ANTICOAGULATED: ICD-10-CM

## 2018-04-24 DIAGNOSIS — I10 ESSENTIAL HYPERTENSION: Primary | ICD-10-CM

## 2018-04-24 DIAGNOSIS — I73.9 PERIPHERAL VASCULAR DISEASE (HCC): ICD-10-CM

## 2018-04-24 DIAGNOSIS — E78.5 HYPERLIPIDEMIA, UNSPECIFIED HYPERLIPIDEMIA TYPE: ICD-10-CM

## 2018-04-24 PROCEDURE — 99213 OFFICE O/P EST LOW 20 MIN: CPT | Performed by: INTERNAL MEDICINE

## 2018-04-24 NOTE — PROGRESS NOTES
Subjective:       Radha Alfred is a 72 y.o. female who here for follow up    CC  LEG SWELLING  HPI  72-year-old female with a known history of the benign essential arterial hypertension, peripheral vascular disease, hyperlipidemia on chronic anticoagulation here for the follow-up after the stress test as well as echocardiogram has been complaining of the bilateral leg swelling mild-to-moderate in intensity     Problem List Items Addressed This Visit        Cardiovascular and Mediastinum    Hypertension - Primary    Peripheral vascular disease    Hyperlipidemia       Hematopoietic and Hemostatic    Anticoagulated      Other Visit Diagnoses    None.       .    The following portions of the patient's history were reviewed and updated as appropriate: allergies, current medications, past family history, past medical history, past social history, past surgical history and problem list.    Past Medical History:   Diagnosis Date   • Anxiety    • Cerebral infarction    • Chest pain    • Diabetes mellitus     TYPE II   • Edema of lower extremity    • GERD (gastroesophageal reflux disease)    • Hyperlipidemia    • Hypertension    • IBS (irritable bowel syndrome)    • MVP (mitral valve prolapse)    • PVD (peripheral vascular disease)    • Stroke     has had 2, 22 yr ago and 06/16   • Venous thromboembolism     reports that she has quit smoking. She has never used smokeless tobacco. She reports that she does not drink alcohol or use drugs.  Family History   Problem Relation Age of Onset   • Diabetes Mother    • Heart disease Mother    • Hypertension Mother    • Hyperlipidemia Mother    • COPD Mother    • Heart disease Father    • Hyperlipidemia Father    • Hypertension Father    • Stroke Father    • Diabetes Father        Review of Systems  Constitutional: No wt loss, fever, fatigue  Gastrointestinal: No nausea, abdominal pain  Behavioral/Psych: No insomnia or anxiety   Cardiovascular Leg swelling  Objective:       Physical  "Exam           Physical Exam  /86 (BP Location: Left arm, Patient Position: Sitting)   Pulse 57   Ht 160 cm (63\")   Wt 64.9 kg (143 lb)   BMI 25.33 kg/m²     General appearance: NAD, conversant   Eyes: anicteric sclerae, moist conjunctivae; no lid-lag; PERRLA   HENT: Atraumatic; oropharynx clear with moist mucous membranes and no mucosal ulcerations;  normal hard and soft palate   Neck: Trachea midline; FROM, supple, no thyromegaly or lymphadenopathy   Lungs: CTA, with normal respiratory effort and no intercostal retractions   CV: S1-S2 regular, no murmurs, no rub, no gallop   Abdomen: Soft, non-tender; no masses or HSM   Extremities: No peripheral edema or extremity lymphadenopathy  Skin: Normal temperature, turgor and texture; no rash, ulcers or subcutaneous nodules   Psych: Appropriate affect, alert and oriented to person, place and time           Cardiographics  @Procedures    Echocardiogram:        Current Outpatient Prescriptions:   •  amLODIPine (NORVASC) 5 MG tablet, Take 5 mg by mouth., Disp: , Rfl:   •  atorvastatin (LIPITOR) 40 MG tablet, TAKE ONE TABLET BY MOUTH EVERY NIGHT AT BEDTIME, Disp: , Rfl:   •  Blood Glucose Monitoring Suppl (ACURA BLOOD GLUCOSE METER) W/DEVICE kit, 1 each., Disp: , Rfl:   •  cycloSPORINE (RESTASIS) 0.05 % ophthalmic emulsion, 1 drop., Disp: , Rfl:   •  ELIQUIS 5 MG tablet tablet, Take 5 mg by mouth 2 (Two) Times a Day., Disp: , Rfl:   •  hydroxychloroquine (PLAQUENIL) 200 MG tablet, TAKE ONE TABLET BY MOUTH TWICE A DAY, Disp: , Rfl:   •  loperamide (IMODIUM A-D) 2 MG tablet, Take 2 mg by mouth 4 (four) times a day., Disp: , Rfl:   •  losartan (COZAAR) 50 MG tablet, Take 50 mg by mouth., Disp: , Rfl:   •  melatonin 5 MG tablet tablet, Take 5 mg by mouth Every Night., Disp: , Rfl:   •  metoprolol tartrate (LOPRESSOR) 25 MG tablet, Take 1 tablet by mouth 2 (Two) Times a Day., Disp: 60 tablet, Rfl: 11  •  pantoprazole (PROTONIX) 40 MG EC tablet, Take 40 mg by mouth " Daily., Disp: , Rfl:   •  Vortioxetine HBr 10 MG tablet, Take 10 mg by mouth., Disp: , Rfl:    Assessment:        Patient Active Problem List   Diagnosis   • Altered mental status   • Traumatic amputation of toe   • Anemia   • Anxiety   • Edema of lower extremity   • Bunion   • Cerebral infarction   • Chest pain   • Depression   • Diabetes mellitus associated with genetic syndrome   • Type 2 diabetes mellitus   • Encounter for screening for diabetes mellitus   • Hypertension   • Disorder of foot   • History of cardiac catheterization   • History of surgical procedure   • Displacement of lumbar intervertebral disc without myelopathy   • Heparin-induced thrombocytopenia   • Peripheral vascular disease   • Spinal stenosis   • Urinary tract bacterial infections   • Small bowel obstruction   • History of cholecystectomy   • Postprocedural state   • Rheumatoid arthritis   • Incisional hernia   • Pain   • Need for vaccination   • Polypharmacy   • Disorder of lipid metabolism   • Memory loss   • Hyperlipidemia   • Anticoagulated     Interpretation Summary        · Findings consistent with an equivocal ECG stress test.  · Left ventricular ejection fraction is hyperdynamic (Calculated EF > 70%).  · Myocardial perfusion imaging indicates a normal myocardial perfusion study with no evidence of ischemia.  · Impressions are consistent with a low risk study.     Interpretation Summary     · Mid right internal carotid artery mild stenosis.  · Mid left internal carotid artery mild stenosis.               Plan:            ICD-10-CM ICD-9-CM   1. Essential hypertension I10 401.9   2. Peripheral vascular disease I73.9 443.9   3. Hyperlipidemia, unspecified hyperlipidemia type E78.5 272.4   4. Anticoagulated Z79.01 V58.61     1. Essential hypertension  The blood pressure under control    2. Peripheral vascular disease  No claudication    3. Hyperlipidemia, unspecified hyperlipidemia type  Counseling has been done    4.  Anticoagulated  Counseling has been done       SEE US 1 YR   COUNSELING:    Radha Delgado was given to patient for the following topics: diagnostic results, risk factor reductions, impressions, risks and benefits of treatment options and importance of treatment compliance .       SMOKING COUNSELING:    Counseling given: Yes      EMR Dragon/Transcription disclaimer:   Much of this encounter note is an electronic transcription/translation of spoken language to printed text. The electronic translation of spoken language may permit erroneous, or at times, nonsensical words or phrases to be inadvertently transcribed; Although I have reviewed the note for such errors, some may still exist.

## 2019-05-21 ENCOUNTER — TELEPHONE (OUTPATIENT)
Dept: CARDIOLOGY | Facility: CLINIC | Age: 74
End: 2019-05-21

## 2019-05-21 NOTE — TELEPHONE ENCOUNTER
Pts. Daughter called (Edel) stating that she is in [facility].  She was asking if Dr. Burnett still went to [place]. I informed her that he does not.

## 2019-07-22 ENCOUNTER — OFFICE VISIT (OUTPATIENT)
Dept: CARDIOLOGY | Facility: CLINIC | Age: 74
End: 2019-07-22

## 2019-07-22 VITALS
HEIGHT: 64 IN | SYSTOLIC BLOOD PRESSURE: 168 MMHG | DIASTOLIC BLOOD PRESSURE: 84 MMHG | WEIGHT: 125 LBS | HEART RATE: 69 BPM | BODY MASS INDEX: 21.34 KG/M2

## 2019-07-22 DIAGNOSIS — I10 ESSENTIAL HYPERTENSION: Primary | ICD-10-CM

## 2019-07-22 DIAGNOSIS — Z79.01 ANTICOAGULATED: ICD-10-CM

## 2019-07-22 DIAGNOSIS — F41.9 ANXIETY: ICD-10-CM

## 2019-07-22 DIAGNOSIS — E78.5 HYPERLIPIDEMIA, UNSPECIFIED HYPERLIPIDEMIA TYPE: ICD-10-CM

## 2019-07-22 PROCEDURE — 99213 OFFICE O/P EST LOW 20 MIN: CPT | Performed by: INTERNAL MEDICINE

## 2019-07-22 PROCEDURE — 93000 ELECTROCARDIOGRAM COMPLETE: CPT | Performed by: INTERNAL MEDICINE

## 2019-07-22 RX ORDER — FUROSEMIDE 40 MG/1
TABLET ORAL AS NEEDED
Status: ON HOLD | COMMUNITY
Start: 2019-06-26 | End: 2020-01-11

## 2019-07-22 RX ORDER — QUETIAPINE FUMARATE 25 MG/1
50 TABLET, FILM COATED ORAL NIGHTLY
Status: ON HOLD | COMMUNITY
End: 2020-01-13 | Stop reason: SDUPTHER

## 2019-11-01 ENCOUNTER — OFFICE VISIT (OUTPATIENT)
Dept: CARDIOLOGY | Facility: CLINIC | Age: 74
End: 2019-11-01

## 2019-11-01 VITALS
WEIGHT: 136 LBS | DIASTOLIC BLOOD PRESSURE: 77 MMHG | HEIGHT: 64 IN | HEART RATE: 118 BPM | SYSTOLIC BLOOD PRESSURE: 125 MMHG | BODY MASS INDEX: 23.22 KG/M2

## 2019-11-01 DIAGNOSIS — I10 ESSENTIAL HYPERTENSION: ICD-10-CM

## 2019-11-01 DIAGNOSIS — R60.0 EDEMA OF LOWER EXTREMITY: ICD-10-CM

## 2019-11-01 DIAGNOSIS — I48.0 PAROXYSMAL ATRIAL FIBRILLATION (HCC): Primary | ICD-10-CM

## 2019-11-01 PROCEDURE — 93000 ELECTROCARDIOGRAM COMPLETE: CPT | Performed by: NURSE PRACTITIONER

## 2019-11-01 PROCEDURE — 99215 OFFICE O/P EST HI 40 MIN: CPT | Performed by: NURSE PRACTITIONER

## 2019-11-01 NOTE — PROGRESS NOTES
Patient Name: Radha Alfred  :1945  Age: 74 y.o.  Primary Cardiologist: Kiran Burnett MD  Encounter Provider:  DIANA Nixon      Chief Complaint:   Chief Complaint   Patient presents with   • Atrial Fibrillation         HPI this 74-year-old female, new to this provider, comes today regarding complaints of atrial fibrillation.  History to include cerebral infarction, DM 2, GERD, hyperlipidemia, hypertension, mitral valve prolapse, PVD, and prior CVA.  Last echo in 2018 revealed mild dilation of LAC, mild concentric LVH, mild MR/TR with RVSP secondary to TR 50 mmHg, mild aortic valve regurgitation, mild pulmonic valve regurgitation, EF 66% with no evidence of pericardial effusion.  Stress testing done at that time revealed no evidence of myocardial ischemia.  Lipid panel in 2018 shows good control with  triglycerides 62 HDL 57 and LDL 61, AST/ALT within normal limits at that time.  She is anticoagulated on apixaban and on beta-blockade.  ECG in office today reveals atrial fibrillation with PVCs and rate 110s.  She states she went into atrial fibrillation approximately 2 weeks ago, normally she converts on her own, but this time she has not.  She is very fatigued and short of breath.    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)   • Type 2 diabetes mellitus (CMS/HCC)   • Encounter for screening for diabetes mellitus   • Hypertension   • Disorder of foot   • History of cardiac catheterization   • History of surgical procedure   • Displacement of lumbar intervertebral disc without myelopathy   • Heparin-induced thrombocytopenia (CMS/HCC)   • Peripheral vascular disease (CMS/HCC)   • Spinal stenosis   • Urinary tract  bacterial infections   • Small bowel obstruction (CMS/HCC)   • History of cholecystectomy   • Postprocedural state   • Rheumatoid arthritis (CMS/HCC)   • Incisional hernia   • Pain   • Need for vaccination   • Polypharmacy   • Disorder of lipid metabolism   • Memory loss   • Hyperlipidemia   • Anticoagulated   • Paroxysmal atrial fibrillation (CMS/HCC)           The following portions of the patient's history were reviewed and updated as appropriate: allergies, current medications, past family history, past medical history, past social history, past surgical history and problem list.    Current Outpatient Medications on File Prior to Visit   Medication Sig Dispense Refill   • amLODIPine (NORVASC) 5 MG tablet Take 5 mg by mouth.     • atorvastatin (LIPITOR) 40 MG tablet TAKE ONE TABLET BY MOUTH EVERY NIGHT AT BEDTIME     • ELIQUIS 5 MG tablet tablet Take 5 mg by mouth 2 (Two) Times a Day.     • hydroxychloroquine (PLAQUENIL) 200 MG tablet 200 mg Daily. TAKE ONE TABLET BY MOUTH TWICE A DAY     • loperamide (IMODIUM A-D) 2 MG tablet Take 2 mg by mouth 4 (four) times a day.     • melatonin 5 MG tablet tablet Take 5 mg by mouth Every Night.     • metoprolol tartrate (LOPRESSOR) 25 MG tablet Take 1 tablet by mouth 2 (Two) Times a Day. (Patient taking differently: Take 50 mg by mouth 3 (Three) Times a Day.) 60 tablet 11   • pantoprazole (PROTONIX) 40 MG EC tablet Take 40 mg by mouth Daily.     • QUEtiapine (SEROquel) 25 MG tablet Take 12.5 mg by mouth Every Night.     • Vortioxetine HBr 10 MG tablet Take 10 mg by mouth.     • Blood Glucose Monitoring Suppl (ACURA BLOOD GLUCOSE METER) W/DEVICE kit 1 each.     • cycloSPORINE (RESTASIS) 0.05 % ophthalmic emulsion 1 drop.     • furosemide (LASIX) 40 MG tablet As Needed.     • losartan (COZAAR) 50 MG tablet Take 50 mg by mouth.       No current facility-administered medications on file prior to visit.        Past Medical History:   Diagnosis Date   • Anxiety    • Atrial  fibrillation (CMS/HCC)    • Cerebral infarction (CMS/HCC)    • Chest pain    • Diabetes mellitus (CMS/HCC)     TYPE II   • Edema of lower extremity    • GERD (gastroesophageal reflux disease)    • Hyperlipidemia    • Hypertension    • IBS (irritable bowel syndrome)    • MVP (mitral valve prolapse)    • PVD (peripheral vascular disease) (CMS/HCC)    • Stroke (CMS/HCC)     has had 2, 22 yr ago and 06/16   • Venous thromboembolism        Past Surgical History:   Procedure Laterality Date   • CARDIAC CATHETERIZATION     • CHOLECYSTECTOMY     • COLONOSCOPY     • CORONARY ARTERY BYPASS GRAFT  1994   • HEMORRHOIDECTOMY     • HERNIA REPAIR     • HYSTERECTOMY     • TOE AMPUTATION         Family History   Problem Relation Age of Onset   • Diabetes Mother    • Heart disease Mother    • Hypertension Mother    • Hyperlipidemia Mother    • COPD Mother    • Heart disease Father    • Hyperlipidemia Father    • Hypertension Father    • Stroke Father    • Diabetes Father        Social History     Socioeconomic History   • Marital status:      Spouse name: Not on file   • Number of children: Not on file   • Years of education: Not on file   • Highest education level: Not on file   Tobacco Use   • Smoking status: Former Smoker   • Smokeless tobacco: Never Used   Substance and Sexual Activity   • Alcohol use: No   • Drug use: No       Review of Systems   Constitution: Positive for malaise/fatigue. Negative for diaphoresis and weakness.   HENT: Negative for nosebleeds and stridor.    Cardiovascular: Negative for chest pain, claudication, dyspnea on exertion, irregular heartbeat, leg swelling, near-syncope, orthopnea, palpitations, paroxysmal nocturnal dyspnea and syncope.   Respiratory: Positive for shortness of breath. Negative for cough, hemoptysis and wheezing.    Gastrointestinal: Negative for abdominal pain, constipation, diarrhea, hematemesis, hematochezia, melena, nausea and vomiting.   Neurological: Negative for dizziness,  "focal weakness and light-headedness.       OBJECTIVE:   Vital Signs  Vitals:    19 0955   BP: 125/77   Pulse: 118     Estimated body mass index is 23.34 kg/m² as calculated from the following:    Height as of this encounter: 162.6 cm (64\").    Weight as of this encounter: 61.7 kg (136 lb).    Physical Exam   Constitutional: She is oriented to person, place, and time. She appears well-developed and well-nourished. No distress.   HENT:   Head: Normocephalic and atraumatic.   Eyes: Conjunctivae and EOM are normal. Pupils are equal, round, and reactive to light.   Neck: Normal range of motion. Neck supple. No JVD present. No tracheal deviation present. No thyromegaly present.   Cardiovascular: Normal heart sounds and intact distal pulses. Exam reveals no gallop and no friction rub.   No murmur heard.  Pulmonary/Chest: Effort normal and breath sounds normal. No respiratory distress. She has no wheezes. She has no rales. She exhibits no tenderness.   Abdominal: Soft. Bowel sounds are normal. She exhibits no distension. There is no tenderness.   Musculoskeletal: Normal range of motion. She exhibits edema. She exhibits no tenderness or deformity.   BLE 2+ pitting edema   Neurological: She is alert and oriented to person, place, and time.   Skin: Skin is warm and dry. No rash noted. She is not diaphoretic. No erythema. No pallor.   Psychiatric: She has a normal mood and affect. Her behavior is normal.         ECG 12 Lead  Date/Time: 2019 10:14 AM  Performed by: Shreya Reyes APRN  Authorized by: Shreya Reyes APRN   Comparison: compared with previous ECG from 2019  Rhythm: atrial fibrillation  Ectopy: unifocal PVCs  Rate: tachycardic    Clinical impression: abnormal EKG            Stress Testin18  Interpretation Summary        · Findings consistent with an equivocal ECG stress test.  · Left ventricular ejection fraction is hyperdynamic (Calculated EF > 70%).  · Myocardial perfusion imaging " indicates a normal myocardial perfusion study with no evidence of ischemia.  · Impressions are consistent with a low risk study.     Asymptomatic for chest pain. Specificity of study reduced secondary to baseline Non-specific ST-T wave abnormalities. Significant motion artifact due to unsteady gait, however ECG is not suggestive of ischemia  Ectopy: none.  Blood pressure response:  Hypertensive baseline and throughout; Baseline 155/65,Peak, 178/60 and Recovery 160//60  Pharmacologic study due to inability to tolerate increasing speed and grade of treadmill due to mobility  Issues and Beta-blocker therapy.  Participated in Low Level exercise and tolerance is poor due to very choppy gait.         Cardiac Echo:  4/9/18  Interpretation Summary     · Calculated right ventricular systolic pressure from tricuspid regurgitation is 50 mmHg.  · Left ventricular wall thickness is consistent with mild concentric hypertrophy.  · Left atrial cavity size is mildly dilated.  · There is calcification of the aortic valve.  · Mild aortic valve regurgitation is present.  · Mild mitral valve regurgitation is present  · Mild tricuspid valve regurgitation is present.  · Mild pulmonic valve regurgitation is present.  · Calculated EF = 66%  · There is no evidence of pericardial effusion           ASSESSMENT:      Diagnosis Plan   1. Paroxysmal atrial fibrillation (CMS/HCC)  ECG 12 Lead    Cardioversion External in Cardiology Department   2. Essential hypertension     3. Edema of lower extremity           PLAN OF CARE:     1.  Hypertension-blood pressure in office today is currently stable and well controlled.  On amlodipine, Lasix, ARB, and beta-blocker.  Last echo and ischemic work-up as above.    Importance of controlling hypertension and blood pressure  checkup on the regular basis has been explained  Hypertension as a silent killer has been discussed  Risk reduction of the weight and regular exercises to control the hypertension  has been explained      2.  Atrial fibrillation-ECG in office today reveals atrial fibrillation with PVCs, rate 110s.  Anticoagulated on apixaban and on beta-blockade.  Patient went into A. fib approximately 2 weeks ago, typically she converts on her own, however this time she has not.  She is symptomatic with fatigue, intermittent shortness of breath, and subsequently has increased BLE edema.  She has Lasix at home, will have her take 20 mg today and she may repeat this dose tomorrow if she needs to.  She is wearing compression stockings.    Patient will be admitted for elective cardioversion, procedure risks/benefits (allergy, arrhythmia, skin trauma) and options of the cardioversion have been explained to the patient/family/POA. He/She/All/They voice understanding and agreement with treatment plan.    In the setting of atrial fibrillation, I have discussed with the patient/family/POA the risks and benefits of anticoagulation therapy which include increased risk of bleeding and decreased risk of systemic embolization such as stroke. Patient/family/POA verbalize understanding and agree with treatment plan.    She states that she has not missed any doses of apixaban in greater than 30 days, and she will be very diligent and not miss any doses leading up to her cardioversion.  Plan to admit the night prior, start amiodarone drip, cardiovert the next morning.    3.  Hyperlipidemia-on statin therapy.  Last lipid panel September 2019 shows good control.  Will repeat fasting lipid panel and LFTs in 6 to 12 months.    Risk of the hyperlipidemia, importance of the treatment has been explained  Pros and cons of the statins has been explained  Regular blood workup as well as side effects including the liver failure, myelopathy death has been explained        Admit next week for planned ECV,, the night before for initiation of amiodarone drip, do not miss any doses of apixaban, may take 1 extra dose of metoprolol if heart  rate is sustained for greater than 1 hour over 150s, may take Lasix 20 mg (home dose) today and repeat dose tomorrow if still swelling.      Sincerely,   DIANA Nixon  [facility]  11/01/19  10:32 AM

## 2019-11-04 ENCOUNTER — HOSPITAL ENCOUNTER (OUTPATIENT)
Facility: HOSPITAL | Age: 74
Setting detail: OBSERVATION
Discharge: HOME | End: 2019-11-05
Attending: INTERNAL MEDICINE | Admitting: INTERNAL MEDICINE

## 2019-11-04 ENCOUNTER — APPOINTMENT (OUTPATIENT)
Dept: CARDIOLOGY | Facility: HOSPITAL | Age: 74
End: 2019-11-04

## 2019-11-04 DIAGNOSIS — I48.0 PAROXYSMAL ATRIAL FIBRILLATION (HCC): Primary | ICD-10-CM

## 2019-11-04 LAB
ALBUMIN SERPL-MCNC: 3.9 G/DL (ref 3.5–5.2)
ALBUMIN/GLOB SERPL: 1.7 G/DL
ALP SERPL-CCNC: 79 U/L (ref 39–117)
ALT SERPL W P-5'-P-CCNC: 14 U/L (ref 1–33)
ANION GAP SERPL CALCULATED.3IONS-SCNC: 14.1 MMOL/L (ref 5–15)
AST SERPL-CCNC: 17 U/L (ref 1–32)
BASOPHILS # BLD AUTO: 0.02 10*3/MM3 (ref 0–0.2)
BASOPHILS NFR BLD AUTO: 0.4 % (ref 0–1.5)
BILIRUB SERPL-MCNC: 0.4 MG/DL (ref 0.2–1.2)
BUN BLD-MCNC: 16 MG/DL (ref 8–23)
BUN/CREAT SERPL: 14.4 (ref 7–25)
CALCIUM SPEC-SCNC: 8.4 MG/DL (ref 8.6–10.5)
CHLORIDE SERPL-SCNC: 106 MMOL/L (ref 98–107)
CO2 SERPL-SCNC: 22.9 MMOL/L (ref 22–29)
CREAT BLD-MCNC: 1.11 MG/DL (ref 0.57–1)
DEPRECATED RDW RBC AUTO: 41.8 FL (ref 37–54)
EOSINOPHIL # BLD AUTO: 0.02 10*3/MM3 (ref 0–0.4)
EOSINOPHIL NFR BLD AUTO: 0.4 % (ref 0.3–6.2)
ERYTHROCYTE [DISTWIDTH] IN BLOOD BY AUTOMATED COUNT: 13 % (ref 12.3–15.4)
GFR SERPL CREATININE-BSD FRML MDRD: 48 ML/MIN/1.73
GLOBULIN UR ELPH-MCNC: 2.3 GM/DL
GLUCOSE BLD-MCNC: 102 MG/DL (ref 65–99)
HCT VFR BLD AUTO: 31.4 % (ref 34–46.6)
HGB BLD-MCNC: 10.2 G/DL (ref 12–15.9)
IMM GRANULOCYTES # BLD AUTO: 0.01 10*3/MM3 (ref 0–0.05)
IMM GRANULOCYTES NFR BLD AUTO: 0.2 % (ref 0–0.5)
LYMPHOCYTES # BLD AUTO: 1.54 10*3/MM3 (ref 0.7–3.1)
LYMPHOCYTES NFR BLD AUTO: 32 % (ref 19.6–45.3)
MAGNESIUM SERPL-MCNC: 1.3 MG/DL (ref 1.6–2.4)
MCH RBC QN AUTO: 29 PG (ref 26.6–33)
MCHC RBC AUTO-ENTMCNC: 32.5 G/DL (ref 31.5–35.7)
MCV RBC AUTO: 89.2 FL (ref 79–97)
MONOCYTES # BLD AUTO: 0.51 10*3/MM3 (ref 0.1–0.9)
MONOCYTES NFR BLD AUTO: 10.6 % (ref 5–12)
NEUTROPHILS # BLD AUTO: 2.71 10*3/MM3 (ref 1.7–7)
NEUTROPHILS NFR BLD AUTO: 56.4 % (ref 42.7–76)
NRBC BLD AUTO-RTO: 0 /100 WBC (ref 0–0.2)
PLATELET # BLD AUTO: 206 10*3/MM3 (ref 140–450)
PMV BLD AUTO: 10.4 FL (ref 6–12)
POTASSIUM BLD-SCNC: 3.9 MMOL/L (ref 3.5–5.2)
PROT SERPL-MCNC: 6.2 G/DL (ref 6–8.5)
RBC # BLD AUTO: 3.52 10*6/MM3 (ref 3.77–5.28)
SODIUM BLD-SCNC: 143 MMOL/L (ref 136–145)
WBC NRBC COR # BLD: 4.81 10*3/MM3 (ref 3.4–10.8)

## 2019-11-04 PROCEDURE — 25010000002 AMIODARONE IN DEXTROSE 5% 150-4.21 MG/100ML-% SOLUTION: Performed by: NURSE PRACTITIONER

## 2019-11-04 PROCEDURE — 96366 THER/PROPH/DIAG IV INF ADDON: CPT

## 2019-11-04 PROCEDURE — 80053 COMPREHEN METABOLIC PANEL: CPT | Performed by: NURSE PRACTITIONER

## 2019-11-04 PROCEDURE — 85025 COMPLETE CBC W/AUTO DIFF WBC: CPT | Performed by: NURSE PRACTITIONER

## 2019-11-04 PROCEDURE — G0378 HOSPITAL OBSERVATION PER HR: HCPCS

## 2019-11-04 PROCEDURE — 93010 ELECTROCARDIOGRAM REPORT: CPT | Performed by: INTERNAL MEDICINE

## 2019-11-04 PROCEDURE — 93306 TTE W/DOPPLER COMPLETE: CPT | Performed by: INTERNAL MEDICINE

## 2019-11-04 PROCEDURE — 96367 TX/PROPH/DG ADDL SEQ IV INF: CPT

## 2019-11-04 PROCEDURE — 25010000002 AMIODARONE IN DEXTROSE 5% 360-4.14 MG/200ML-% SOLUTION: Performed by: NURSE PRACTITIONER

## 2019-11-04 PROCEDURE — 93005 ELECTROCARDIOGRAM TRACING: CPT | Performed by: NURSE PRACTITIONER

## 2019-11-04 PROCEDURE — S0260 H&P FOR SURGERY: HCPCS | Performed by: NURSE PRACTITIONER

## 2019-11-04 PROCEDURE — 83735 ASSAY OF MAGNESIUM: CPT | Performed by: NURSE PRACTITIONER

## 2019-11-04 PROCEDURE — 93306 TTE W/DOPPLER COMPLETE: CPT

## 2019-11-04 PROCEDURE — 96365 THER/PROPH/DIAG IV INF INIT: CPT

## 2019-11-04 RX ORDER — CHOLECALCIFEROL (VITAMIN D3) 125 MCG
5 CAPSULE ORAL NIGHTLY
Status: DISCONTINUED | OUTPATIENT
Start: 2019-11-04 | End: 2019-11-04

## 2019-11-04 RX ORDER — CHOLECALCIFEROL (VITAMIN D3) 125 MCG
5 CAPSULE ORAL NIGHTLY PRN
Status: DISCONTINUED | OUTPATIENT
Start: 2019-11-04 | End: 2019-11-05 | Stop reason: HOSPADM

## 2019-11-04 RX ORDER — AMLODIPINE BESYLATE 5 MG/1
5 TABLET ORAL
Status: DISCONTINUED | OUTPATIENT
Start: 2019-11-05 | End: 2019-11-05

## 2019-11-04 RX ORDER — QUETIAPINE FUMARATE 25 MG/1
12.5 TABLET, FILM COATED ORAL NIGHTLY
Status: DISCONTINUED | OUTPATIENT
Start: 2019-11-04 | End: 2019-11-05 | Stop reason: HOSPADM

## 2019-11-04 RX ORDER — NITROGLYCERIN 0.4 MG/1
0.4 TABLET SUBLINGUAL
Status: DISCONTINUED | OUTPATIENT
Start: 2019-11-04 | End: 2019-11-05 | Stop reason: HOSPADM

## 2019-11-04 RX ORDER — ATORVASTATIN CALCIUM 20 MG/1
40 TABLET, FILM COATED ORAL NIGHTLY
Status: DISCONTINUED | OUTPATIENT
Start: 2019-11-04 | End: 2019-11-05 | Stop reason: HOSPADM

## 2019-11-04 RX ORDER — VILAZODONE HYDROCHLORIDE 10 MG/1
10 TABLET ORAL DAILY
Status: DISCONTINUED | OUTPATIENT
Start: 2019-11-05 | End: 2019-11-05 | Stop reason: HOSPADM

## 2019-11-04 RX ORDER — CYCLOSPORINE 0.5 MG/ML
1 EMULSION OPHTHALMIC 2 TIMES DAILY
Status: DISCONTINUED | OUTPATIENT
Start: 2019-11-04 | End: 2019-11-05 | Stop reason: HOSPADM

## 2019-11-04 RX ORDER — SODIUM CHLORIDE 0.9 % (FLUSH) 0.9 %
10 SYRINGE (ML) INJECTION EVERY 12 HOURS SCHEDULED
Status: DISCONTINUED | OUTPATIENT
Start: 2019-11-04 | End: 2019-11-05 | Stop reason: HOSPADM

## 2019-11-04 RX ORDER — SODIUM CHLORIDE 0.9 % (FLUSH) 0.9 %
10 SYRINGE (ML) INJECTION AS NEEDED
Status: DISCONTINUED | OUTPATIENT
Start: 2019-11-04 | End: 2019-11-05 | Stop reason: HOSPADM

## 2019-11-04 RX ORDER — METOPROLOL TARTRATE 50 MG/1
50 TABLET, FILM COATED ORAL EVERY 12 HOURS SCHEDULED
Status: DISCONTINUED | OUTPATIENT
Start: 2019-11-04 | End: 2019-11-05 | Stop reason: HOSPADM

## 2019-11-04 RX ORDER — METOPROLOL TARTRATE 50 MG/1
50 TABLET, FILM COATED ORAL EVERY 8 HOURS SCHEDULED
Status: DISCONTINUED | OUTPATIENT
Start: 2019-11-04 | End: 2019-11-04

## 2019-11-04 RX ORDER — HYDROXYCHLOROQUINE SULFATE 200 MG/1
200 TABLET, FILM COATED ORAL
Status: DISCONTINUED | OUTPATIENT
Start: 2019-11-05 | End: 2019-11-05 | Stop reason: HOSPADM

## 2019-11-04 RX ORDER — PANTOPRAZOLE SODIUM 40 MG/1
40 TABLET, DELAYED RELEASE ORAL EVERY MORNING
Status: DISCONTINUED | OUTPATIENT
Start: 2019-11-05 | End: 2019-11-05 | Stop reason: HOSPADM

## 2019-11-04 RX ORDER — LOSARTAN POTASSIUM 50 MG/1
50 TABLET ORAL
Status: DISCONTINUED | OUTPATIENT
Start: 2019-11-05 | End: 2019-11-05

## 2019-11-04 RX ADMIN — QUETIAPINE FUMARATE 12.5 MG: 25 TABLET ORAL at 20:31

## 2019-11-04 RX ADMIN — ATORVASTATIN CALCIUM 40 MG: 20 TABLET, FILM COATED ORAL at 20:31

## 2019-11-04 RX ADMIN — METOPROLOL TARTRATE 50 MG: 50 TABLET, FILM COATED ORAL at 15:31

## 2019-11-04 RX ADMIN — AMIODARONE HYDROCHLORIDE 150 MG: 1.5 INJECTION, SOLUTION INTRAVENOUS at 11:40

## 2019-11-04 RX ADMIN — AMIODARONE HYDROCHLORIDE 1 MG/MIN: 1.8 INJECTION, SOLUTION INTRAVENOUS at 11:41

## 2019-11-04 RX ADMIN — AMIODARONE HYDROCHLORIDE 0.5 MG/MIN: 1.8 INJECTION, SOLUTION INTRAVENOUS at 17:55

## 2019-11-04 RX ADMIN — APIXABAN 5 MG: 5 TABLET, FILM COATED ORAL at 17:55

## 2019-11-04 RX ADMIN — METOPROLOL TARTRATE 50 MG: 50 TABLET, FILM COATED ORAL at 20:32

## 2019-11-04 RX ADMIN — SODIUM CHLORIDE, PRESERVATIVE FREE 10 ML: 5 INJECTION INTRAVENOUS at 20:35

## 2019-11-04 RX ADMIN — APIXABAN 5 MG: 5 TABLET, FILM COATED ORAL at 20:31

## 2019-11-04 NOTE — CONSULTS
"Internal medicine consult    Primary care physician  Dr. Shaikh    Referring physician  Dr. ROBLEDO    Reason for consult  Follow medical problems    History of present illness  74-year-old white female with history of chronic atrial fibrillation CVA diabetes hypertension hyperlipidemia peripheral vascular disease and gastroesophageal reflux disease admitted to cardiology service with complaint of palpitation and work-up revealed in cardiology office rapid ventricular rate admitted for cardioversion and I am asked to follow the patient for medical problems.  At the time of interview she still having palpitation but denies any chest pain or shortness of breath.  Patient also denies any fever chills.  No other complaints.    Past medical history  Paroxysmal atrial fibrillation  Hyperlipidemia   Hypertension  Depression  Peripheral vascular disease  Status post CVA  Gastroesophageal reflux disease    Past surgical history  Status post cholecystectomy  Status post CABG  Status post hernia repair  Hysterectomy  Status post toe amputation    Social history  Ex-smoker denies any recent alcohol tobacco or drug abuse    Family history  Noncontributory     Review of systems  As above    Physical examination  Blood pressure 139/86, pulse 108, temperature 97.6 °F (36.4 °C), temperature source Oral, resp. rate 16, height 162.6 cm (64\"), weight 61.7 kg (136 lb), SpO2 100 %.    HEENT unremarkable  Neck supple  Lungs decreased breath sound bilaterally no wheezes rhonchi or crackles  Heart irregular S1-S2  Abdomen soft nontender bowel is positive  Extremities 2+ edema  Neuro moves all 4 extremities no focal deficit  Psych normal mood and behavior    LABS  Lab Results (last 24 hours)     Procedure Component Value Units Date/Time    Comprehensive Metabolic Panel [[id number]]  (Abnormal) Collected:  11/04/19 1138    Specimen:  Blood Updated:  11/04/19 1218     Glucose 102 mg/dL      BUN 16 mg/dL      Creatinine 1.11 mg/dL      Sodium 143 " mmol/L      Potassium 3.9 mmol/L      Chloride 106 mmol/L      CO2 22.9 mmol/L      Calcium 8.4 mg/dL      Total Protein 6.2 g/dL      Albumin 3.90 g/dL      ALT (SGPT) 14 U/L      AST (SGOT) 17 U/L      Alkaline Phosphatase 79 U/L      Total Bilirubin 0.4 mg/dL      eGFR Non African Amer 48 mL/min/1.73      Globulin 2.3 gm/dL      A/G Ratio 1.7 g/dL      BUN/Creatinine Ratio 14.4     Anion Gap 14.1 mmol/L     Narrative:       GFR Normal >60  Chronic Kidney Disease <60  Kidney Failure <15    Magnesium [[id number]]  (Abnormal) Collected:  11/04/19 1138    Specimen:  Blood Updated:  11/04/19 1218     Magnesium 1.3 mg/dL     CBC Auto Differential [[id number]]  (Abnormal) Collected:  11/04/19 1138    Specimen:  Blood Updated:  11/04/19 1146     WBC 4.81 10*3/mm3      RBC 3.52 10*6/mm3      Hemoglobin 10.2 g/dL      Hematocrit 31.4 %      MCV 89.2 fL      MCH 29.0 pg      MCHC 32.5 g/dL      RDW 13.0 %      RDW-SD 41.8 fl      MPV 10.4 fL      Platelets 206 10*3/mm3      Neutrophil % 56.4 %      Lymphocyte % 32.0 %      Monocyte % 10.6 %      Eosinophil % 0.4 %      Basophil % 0.4 %      Immature Grans % 0.2 %      Neutrophils, Absolute 2.71 10*3/mm3      Lymphocytes, Absolute 1.54 10*3/mm3      Monocytes, Absolute 0.51 10*3/mm3      Eosinophils, Absolute 0.02 10*3/mm3      Basophils, Absolute 0.02 10*3/mm3      Immature Grans, Absolute 0.01 10*3/mm3      nRBC 0.0 /100 WBC         Imaging Results (Last 24 Hours)     ** No results found for the last 24 hours. **        ECG 12 Lead        HEART RATE= 94  bpm  RR Interval= 636  ms  HI Interval=   ms  P Horizontal Axis=   deg  P Front Axis=   deg  QRSD Interval= 85  ms  QT Interval= 386  ms  QRS Axis= -7  deg  T Wave Axis= 175  deg  - ABNORMAL ECG -  Atrial fibrillation  Ventricular premature complex  Anterior infarct, old  Nonspecific T abnormalities, lateral leads             Current Facility-Administered Medications:   •  [COMPLETED] amiodarone in dextrose 5% (NEXTERONE)  loading dose 150mg/100mL, 150 mg, Intravenous, Once, Last Rate: 600 mL/hr at 11/04/19 1140, 150 mg at 11/04/19 1140 **FOLLOWED BY** amiodarone (NEXTERONE) 360 mg/200 mL (1.8 mg/mL) infusion, 1 mg/min, Intravenous, Continuous, Last Rate: 33.3 mL/hr at 11/04/19 1141, 1 mg/min at 11/04/19 1141 **FOLLOWED BY** amiodarone (NEXTERONE) 360 mg/200 mL (1.8 mg/mL) infusion, 0.5 mg/min, Intravenous, Continuous, Shreya Reyes APRN  •  [START ON 11/5/2019] amLODIPine (NORVASC) tablet 5 mg, 5 mg, Oral, Q24H, Shreya Reyes APRNITIN  •  apixaban (ELIQUIS) tablet 5 mg, 5 mg, Oral, Q12H, Shreya Reyes APRN  •  atorvastatin (LIPITOR) tablet 40 mg, 40 mg, Oral, Nightly, Shreya Reyes, APRNITIN  •  cycloSPORINE (RESTASIS) 0.05 % ophthalmic emulsion 1 drop, 1 drop, Both Eyes, BID, Shreya Reyes APRN  •  [START ON 11/5/2019] hydroxychloroquine (PLAQUENIL) tablet 200 mg, 200 mg, Oral, Q24H, Shreya Reyes, APRNITIN  •  [START ON 11/5/2019] losartan (COZAAR) tablet 50 mg, 50 mg, Oral, Q24H, Shreya Reyes, APRNITIN  •  melatonin tablet 5 mg, 5 mg, Oral, Nightly, Shreya Reyes, APRN  •  metoprolol tartrate (LOPRESSOR) tablet 50 mg, 50 mg, Oral, Q12H, Shreya Reyes APRN, 50 mg at 11/04/19 1531  •  nitroglycerin (NITROSTAT) SL tablet 0.4 mg, 0.4 mg, Sublingual, Q5 Min PRN, Shreya Reyes APRN  •  [START ON 11/5/2019] pantoprazole (PROTONIX) EC tablet 40 mg, 40 mg, Oral, QAM, Shreya Reyes, APRN  •  QUEtiapine (SEROquel) tablet 12.5 mg, 12.5 mg, Oral, Nightly, Shreya Reyes, APRNITIN  •  sodium chloride 0.9 % flush 10 mL, 10 mL, Intravenous, Q12H, Shreya Reyes, DIANA  •  sodium chloride 0.9 % flush 10 mL, 10 mL, Intravenous, PRN, Shreya Reyes, DIANA  •  [START ON 11/5/2019] vilazodone (VIIBRYD) tablet 10 mg, 10 mg, Oral, Daily, Shreya Reyes, APRNITIN    ASSESSMENT  Paroxysmal atrial fibrillation with rapid ventricular rate  Hypertension  Hyperlipidemia  Peripheral vascular disease  Status post CVA with no  residual weakness  Rheumatoid arthritis on Plaquenil  Chronic anemia  Gastroesophageal reflux disease    PLAN  I agree with current care  Controlled heart rate  Anticoagulation  Cardioversion in a.m.  Adjust home medications  Stress ulcer DVT prophylaxis  Supportive care  Discussed with family  Patient is full code  Repeat labs in the morning  We will follow with Dr. ROBLEDO further recommendation according to hospital course    KAMILAH DURAN MD

## 2019-11-04 NOTE — H&P
Patient Name: Radha Alfred  :1945  Age: 74 y.o.  Primary Cardiologist: Kiran Burnett MD  Encounter Provider:  No name on file.      Chief Complaint:   No chief complaint on file.        HPI this 74-year-old female, new to this provider, comes today regarding complaints of atrial fibrillation.  History to include cerebral infarction, DM 2, GERD, hyperlipidemia, hypertension, mitral valve prolapse, PVD, and prior CVA.  Last echo in 2018 revealed mild dilation of LAC, mild concentric LVH, mild MR/TR with RVSP secondary to TR 50 mmHg, mild aortic valve regurgitation, mild pulmonic valve regurgitation, EF 66% with no evidence of pericardial effusion.  Stress testing done at that time revealed no evidence of myocardial ischemia.  Lipid panel in 2018 shows good control with  triglycerides 62 HDL 57 and LDL 61, AST/ALT within normal limits at that time.  She is anticoagulated on apixaban and on beta-blockade.  ECG in office today reveals atrial fibrillation with PVCs and rate 110s.  She states she went into atrial fibrillation approximately 2 weeks ago, normally she converts on her own, but this time she has not.  She is very fatigued and short of breath.    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)   • Type 2 diabetes mellitus (CMS/HCC)   • Encounter for screening for diabetes mellitus   • Hypertension   • Disorder of foot   • History of cardiac catheterization   • History of surgical procedure   • Displacement of lumbar intervertebral disc without myelopathy   • Heparin-induced thrombocytopenia (CMS/HCC)   • Peripheral vascular disease (CMS/HCC)   • Spinal stenosis   • Urinary tract bacterial infections   • Small bowel  obstruction (CMS/HCC)   • History of cholecystectomy   • Postprocedural state   • Rheumatoid arthritis (CMS/HCC)   • Incisional hernia   • Pain   • Need for vaccination   • Polypharmacy   • Disorder of lipid metabolism   • Memory loss   • Hyperlipidemia   • Anticoagulated   • Paroxysmal atrial fibrillation (CMS/HCC)           The following portions of the patient's history were reviewed and updated as appropriate: allergies, current medications, past family history, past medical history, past social history, past surgical history and problem list.    No current facility-administered medications on file prior to encounter.      Current Outpatient Medications on File Prior to Encounter   Medication Sig Dispense Refill   • amLODIPine (NORVASC) 5 MG tablet Take 5 mg by mouth.     • atorvastatin (LIPITOR) 40 MG tablet TAKE ONE TABLET BY MOUTH EVERY NIGHT AT BEDTIME     • Blood Glucose Monitoring Suppl (ACURA BLOOD GLUCOSE METER) W/DEVICE kit 1 each.     • cycloSPORINE (RESTASIS) 0.05 % ophthalmic emulsion 1 drop.     • ELIQUIS 5 MG tablet tablet Take 5 mg by mouth 2 (Two) Times a Day.     • furosemide (LASIX) 40 MG tablet As Needed.     • hydroxychloroquine (PLAQUENIL) 200 MG tablet 200 mg Daily. TAKE ONE TABLET BY MOUTH TWICE A DAY     • loperamide (IMODIUM A-D) 2 MG tablet Take 2 mg by mouth 4 (four) times a day.     • losartan (COZAAR) 50 MG tablet Take 50 mg by mouth.     • melatonin 5 MG tablet tablet Take 5 mg by mouth Every Night.     • metoprolol tartrate (LOPRESSOR) 25 MG tablet Take 1 tablet by mouth 2 (Two) Times a Day. (Patient taking differently: Take 50 mg by mouth 3 (Three) Times a Day.) 60 tablet 11   • pantoprazole (PROTONIX) 40 MG EC tablet Take 40 mg by mouth Daily.     • QUEtiapine (SEROquel) 25 MG tablet Take 12.5 mg by mouth Every Night.     • Vortioxetine HBr 10 MG tablet Take 10 mg by mouth.         Past Medical History:   Diagnosis Date   • Anxiety    • Atrial fibrillation (CMS/HCC)    • Cerebral  infarction (CMS/HCC)    • Chest pain    • Diabetes mellitus (CMS/HCC)     TYPE II   • Edema of lower extremity    • GERD (gastroesophageal reflux disease)    • Hyperlipidemia    • Hypertension    • IBS (irritable bowel syndrome)    • MVP (mitral valve prolapse)    • PVD (peripheral vascular disease) (CMS/HCC)    • Stroke (CMS/HCC)     has had 2, 22 yr ago and 06/16   • Venous thromboembolism        Past Surgical History:   Procedure Laterality Date   • CARDIAC CATHETERIZATION     • CHOLECYSTECTOMY     • COLONOSCOPY     • CORONARY ARTERY BYPASS GRAFT  1994   • HEMORRHOIDECTOMY     • HERNIA REPAIR     • HYSTERECTOMY     • TOE AMPUTATION         Family History   Problem Relation Age of Onset   • Diabetes Mother    • Heart disease Mother    • Hypertension Mother    • Hyperlipidemia Mother    • COPD Mother    • Heart disease Father    • Hyperlipidemia Father    • Hypertension Father    • Stroke Father    • Diabetes Father        Social History     Socioeconomic History   • Marital status:      Spouse name: Not on file   • Number of children: Not on file   • Years of education: Not on file   • Highest education level: Not on file   Tobacco Use   • Smoking status: Former Smoker   • Smokeless tobacco: Never Used   Substance and Sexual Activity   • Alcohol use: No   • Drug use: No       Review of Systems   Constitution: Positive for malaise/fatigue. Negative for diaphoresis and weakness.   HENT: Negative for nosebleeds and stridor.    Cardiovascular: Negative for chest pain, claudication, dyspnea on exertion, irregular heartbeat, leg swelling, near-syncope, orthopnea, palpitations, paroxysmal nocturnal dyspnea and syncope.   Respiratory: Positive for shortness of breath. Negative for cough, hemoptysis and wheezing.    Gastrointestinal: Negative for abdominal pain, constipation, diarrhea, hematemesis, hematochezia, melena, nausea and vomiting.   Neurological: Negative for dizziness, focal weakness and light-headedness.  "      OBJECTIVE:   Vital Signs  Vitals:    19 1007   BP: 148/97   Pulse: 106   Resp: 17   SpO2: 100%     Estimated body mass index is 23.34 kg/m² as calculated from the following:    Height as of 19: 162.6 cm (64\").    Weight as of 19: 61.7 kg (136 lb).    Physical Exam   Constitutional: She is oriented to person, place, and time. She appears well-developed and well-nourished. No distress.   HENT:   Head: Normocephalic and atraumatic.   Eyes: Conjunctivae and EOM are normal. Pupils are equal, round, and reactive to light.   Neck: Normal range of motion. Neck supple. No JVD present. No tracheal deviation present. No thyromegaly present.   Cardiovascular: Normal heart sounds and intact distal pulses. Exam reveals no gallop and no friction rub.   No murmur heard.  Pulmonary/Chest: Effort normal and breath sounds normal. No respiratory distress. She has no wheezes. She has no rales. She exhibits no tenderness.   Abdominal: Soft. Bowel sounds are normal. She exhibits no distension. There is no tenderness.   Musculoskeletal: Normal range of motion. She exhibits edema. She exhibits no tenderness or deformity.   BLE 2+ pitting edema   Neurological: She is alert and oriented to person, place, and time.   Skin: Skin is warm and dry. No rash noted. She is not diaphoretic. No erythema. No pallor.   Psychiatric: She has a normal mood and affect. Her behavior is normal.       Procedures    Stress Testin18  Interpretation Summary        · Findings consistent with an equivocal ECG stress test.  · Left ventricular ejection fraction is hyperdynamic (Calculated EF > 70%).  · Myocardial perfusion imaging indicates a normal myocardial perfusion study with no evidence of ischemia.  · Impressions are consistent with a low risk study.     Asymptomatic for chest pain. Specificity of study reduced secondary to baseline Non-specific ST-T wave abnormalities. Significant motion artifact due to unsteady gait, however ECG " is not suggestive of ischemia  Ectopy: none.  Blood pressure response:  Hypertensive baseline and throughout; Baseline 155/65,Peak, 178/60 and Recovery 160//60  Pharmacologic study due to inability to tolerate increasing speed and grade of treadmill due to mobility  Issues and Beta-blocker therapy.  Participated in Low Level exercise and tolerance is poor due to very choppy gait.         Cardiac Echo:  4/9/18  Interpretation Summary     · Calculated right ventricular systolic pressure from tricuspid regurgitation is 50 mmHg.  · Left ventricular wall thickness is consistent with mild concentric hypertrophy.  · Left atrial cavity size is mildly dilated.  · There is calcification of the aortic valve.  · Mild aortic valve regurgitation is present.  · Mild mitral valve regurgitation is present  · Mild tricuspid valve regurgitation is present.  · Mild pulmonic valve regurgitation is present.  · Calculated EF = 66%  · There is no evidence of pericardial effusion           ASSESSMENT:     No diagnosis found.      PLAN OF CARE:     1.  Hypertension-blood pressure in office today is currently stable and well controlled.  On amlodipine, Lasix, ARB, and beta-blocker.  Last echo and ischemic work-up as above.    Importance of controlling hypertension and blood pressure  checkup on the regular basis has been explained  Hypertension as a silent killer has been discussed  Risk reduction of the weight and regular exercises to control the hypertension has been explained      2.  Atrial fibrillation-ECG in office today reveals atrial fibrillation with PVCs, rate 110s.  Anticoagulated on apixaban and on beta-blockade.  Patient went into A. fib approximately 2 weeks ago, typically she converts on her own, however this time she has not.  She is symptomatic with fatigue, intermittent shortness of breath, and subsequently has increased BLE edema.  She has Lasix at home, will have her take 20 mg today and she may repeat this dose  tomorrow if she needs to.  She is wearing compression stockings.    Patient will be admitted for elective cardioversion, procedure risks/benefits (allergy, arrhythmia, skin trauma) and options of the cardioversion have been explained to the patient/family/POA. He/She/All/They voice understanding and agreement with treatment plan.    In the setting of atrial fibrillation, I have discussed with the patient/family/POA the risks and benefits of anticoagulation therapy which include increased risk of bleeding and decreased risk of systemic embolization such as stroke. Patient/family/POA verbalize understanding and agree with treatment plan.    She states that she has not missed any doses of apixaban in greater than 30 days, and she will be very diligent and not miss any doses leading up to her cardioversion.  Plan to admit the night prior, start amiodarone drip, cardiovert the next morning.    3.  Hyperlipidemia-on statin therapy.  Last lipid panel September 2019 shows good control.  Will repeat fasting lipid panel and LFTs in 6 to 12 months.    Risk of the hyperlipidemia, importance of the treatment has been explained  Pros and cons of the statins has been explained  Regular blood workup as well as side effects including the liver failure, myelopathy death has been explained        Admit next week for planned ECV,, the night before for initiation of amiodarone drip, do not miss any doses of apixaban, may take 1 extra dose of metoprolol if heart rate is sustained for greater than 1 hour over 150s, may take Lasix 20 mg (home dose) today and repeat dose tomorrow if still swelling.      Sincerely,   DIANA Nixon  [facility]  11/04/19  11:17 AM

## 2019-11-04 NOTE — PROGRESS NOTES
[facility]  Cardiology Progress Note    Patient Identification:  Name: Radha Alfred  Age: 74 y.o.  Sex: female  :  1945  MRN: [id number]                 Follow Up / Chief Complaint: atrial fibrillation    Interval History: Admitted for ECV and amiodarone initiation       Subjective: c/o fatigue and SOA; denies CP      Objective:    Past Medical History:  Past Medical History:   Diagnosis Date   • Anxiety    • Atrial fibrillation (CMS/HCC)    • Cerebral infarction (CMS/HCC)    • Chest pain    • Diabetes mellitus (CMS/HCC)     TYPE II   • Edema of lower extremity    • GERD (gastroesophageal reflux disease)    • Hyperlipidemia    • Hypertension    • IBS (irritable bowel syndrome)    • MVP (mitral valve prolapse)    • PVD (peripheral vascular disease) (CMS/HCC)    • Stroke (CMS/HCC)     has had 2, 22 yr ago and    • Venous thromboembolism      Past Surgical History:  Past Surgical History:   Procedure Laterality Date   • CARDIAC CATHETERIZATION     • CHOLECYSTECTOMY     • COLONOSCOPY     • CORONARY ARTERY BYPASS GRAFT     • HEMORRHOIDECTOMY     • HERNIA REPAIR     • HYSTERECTOMY     • TOE AMPUTATION          Social History:   Social History     Tobacco Use   • Smoking status: Former Smoker   • Smokeless tobacco: Never Used   Substance Use Topics   • Alcohol use: No      Family History:  Family History   Problem Relation Age of Onset   • Diabetes Mother    • Heart disease Mother    • Hypertension Mother    • Hyperlipidemia Mother    • COPD Mother    • Heart disease Father    • Hyperlipidemia Father    • Hypertension Father    • Stroke Father    • Diabetes Father           Allergies:  Allergies   Allergen Reactions   • Amoxicillin Diarrhea   • Contrast Dye Other (See Comments)     CAUSED ELEV BP   • Heparin Other (See Comments)     CAUSED MORE CLOTTING AFTER AORTA MIO OJEDA   • Iodinated Diagnostic Agents Other (See Comments)     Other reaction(s): Other (See Comments)  CAUSED ELEV  "BP  CAUSED ELEV BP  CAUSED ELEV BP  CAUSED ELEV BP   • Erythromycin Diarrhea   • Meperidine GI Intolerance     N/V   • Sulfa Antibiotics Diarrhea     N/V   • Baclofen Other (See Comments)     Altered mental status     Scheduled Meds:    amiodarone 150 mg Once   [START ON 2019] amLODIPine 5 mg Q24H   apixaban 5 mg Q12H   atorvastatin 40 mg Nightly   cycloSPORINE 1 drop BID   [START ON 2019] hydroxychloroquine 200 mg Q24H   [START ON 2019] losartan 50 mg Q24H   melatonin 5 mg Nightly   metoprolol tartrate 50 mg Q8H   [START ON 2019] pantoprazole 40 mg QAM   QUEtiapine 12.5 mg Nightly   sodium chloride 10 mL Q12H   [START ON 2019] vilazodone 10 mg Daily     I have reviewed the patient's recent medical history and current medications, as well as personally reviewed/interpreted the ECG/telemetry data    INTAKE AND OUTPUT:  No intake or output data in the 24 hours ending 19 1146    ROS  Constitutional: Awake and alert, no fever. No nosebleeds  Abdomen           no abdominal pain   Cardiac              no chest pain  Pulmonary          no shortness of breath      /99 (BP Location: Right arm, Patient Position: Lying)   Pulse 86   Temp 97.6 °F (36.4 °C) (Oral)   Resp 16   Ht 162.6 cm (64\")   Wt 61.7 kg (136 lb)   SpO2 97%   BMI 23.34 kg/m²   General appearance: No acute changes   Neck: Trachea midline; NECK, supple, no thyromegaly or lymphadenopathy   Lungs: Normal size and shape, normal breath sounds, equal distribution of air, no rales and rhonchi   CV: S1-S2 regular, no murmurs, no rub, no gallop   Abdomen: Soft, non-tender; no masses , no abnormal abdominal sounds   Extremities: No deformity , normal color , no peripheral edema   Skin: Normal temperature, turgor and texture; no rash, ulcers            Cardiographics  Telemetry:    atrial fibrillation with frequent PVCs rate 110s      EC/4/19 atrial fibrillation with PVCs rate 90s      Echocardiogram: "   4/9/18  Interpretation Summary     · Calculated right ventricular systolic pressure from tricuspid regurgitation is 50 mmHg.  · Left ventricular wall thickness is consistent with mild concentric hypertrophy.  · Left atrial cavity size is mildly dilated.  · There is calcification of the aortic valve.  · Mild aortic valve regurgitation is present.  · Mild mitral valve regurgitation is present  · Mild tricuspid valve regurgitation is present.  · Mild pulmonic valve regurgitation is present.  · Calculated EF = 66%  · There is no evidence of pericardial effusion     Stress test  4/17/18  Interpretation Summary        · Findings consistent with an equivocal ECG stress test.  · Left ventricular ejection fraction is hyperdynamic (Calculated EF > 70%).  · Myocardial perfusion imaging indicates a normal myocardial perfusion study with no evidence of ischemia.  · Impressions are consistent with a low risk study.     Asymptomatic for chest pain. Specificity of study reduced secondary to baseline Non-specific ST-T wave abnormalities. Significant motion artifact due to unsteady gait, however ECG is not suggestive of ischemia  Ectopy: none.  Blood pressure response:  Hypertensive baseline and throughout; Baseline 155/65,Peak, 178/60 and Recovery 160//60  Pharmacologic study due to inability to tolerate increasing speed and grade of treadmill due to mobility  Issues and Beta-blocker therapy.  Participated in Low Level exercise and tolerance is poor due to very choppy gait.         Lab Review           Results from last 7 days   Lab Units 10/29/19  1246   SODIUM mmol/L 143   POTASSIUM mmol/L 4.2   BUN mg/dL 10   CREATININE mg/dL 1.1   CALCIUM mg/dL 9.0       Results from last 7 days   Lab Units 10/29/19  1246   WBC 10*3/uL 3.92*   HEMOGLOBIN g/dL 10.2*   HEMATOCRIT % 33.3*   PLATELETS 10*3/uL 169     Estimated Creatinine Clearance: 43 mL/min (by C-G formula based on SCr of 1.1 mg/dL).    I have reviewed the medical decision  "making with Dr. Burnett in detail.     Assessment:  1.  Atrial fibrillation  2.  Anemia  3.  Cerebral infarction  4.  DM2  5.  GERD  6.  Hyperlipidemia   7.  Hypertension  8.  MVP  9.  PVD    Plan:  Will check echo, start amio gtt, plan for ECV in am.  Lipid panel in am, continue statin.  Will ask IM to see patient manage comorbidities throughout admission.  BP stable and controlled on admission.  Will reduce metoprolol to BID.     Radha Alfred IS ON AMIODARONE,   Significant side effects associated with this medication has been explained     Pros and cons of the medications has been discussed, decision has been to continue the medication   6 months to yearly checkup for eye examination, thyroid function test, chest x-ray and liver function test has been advised    Patient understands well    Labs/tests ordered: amio gtt with bolus, decrease BB, continue apixaban, ECV in am      )11/4/2019  DIANA Nixon    Patient personally interviewed and above subjective findings personally confirmed during a face to face contact with patient today  All findings of physical examination confirmed  All pertinent and performed labs, cardiac procedures ,  radiographs of the last 24 hours personally reviewed  Impression and plans discussed/elaborated and implemented jointly as described above     Kiran Burnett MD            EMR Dragon/Transcription:   \"Dictated utilizing Dragon dictation\".   "

## 2019-11-05 ENCOUNTER — ANESTHESIA EVENT (OUTPATIENT)
Dept: POSTOP/PACU | Facility: HOSPITAL | Age: 74
End: 2019-11-05

## 2019-11-05 ENCOUNTER — ANESTHESIA (OUTPATIENT)
Dept: POSTOP/PACU | Facility: HOSPITAL | Age: 74
End: 2019-11-05

## 2019-11-05 ENCOUNTER — HOSPITAL ENCOUNTER (OUTPATIENT)
Dept: POSTOP/PACU | Facility: HOSPITAL | Age: 74
Discharge: HOME | End: 2019-11-05
Admitting: INTERNAL MEDICINE

## 2019-11-05 VITALS
OXYGEN SATURATION: 99 % | DIASTOLIC BLOOD PRESSURE: 66 MMHG | SYSTOLIC BLOOD PRESSURE: 140 MMHG | RESPIRATION RATE: 16 BRPM | TEMPERATURE: 97.7 F | HEART RATE: 62 BPM

## 2019-11-05 VITALS
WEIGHT: 136 LBS | BODY MASS INDEX: 23.22 KG/M2 | HEART RATE: 68 BPM | HEIGHT: 64 IN | SYSTOLIC BLOOD PRESSURE: 167 MMHG | RESPIRATION RATE: 16 BRPM | DIASTOLIC BLOOD PRESSURE: 84 MMHG | OXYGEN SATURATION: 97 % | TEMPERATURE: 97.8 F

## 2019-11-05 VITALS — SYSTOLIC BLOOD PRESSURE: 117 MMHG | DIASTOLIC BLOOD PRESSURE: 64 MMHG

## 2019-11-05 DIAGNOSIS — I48.0 PAROXYSMAL ATRIAL FIBRILLATION (HCC): ICD-10-CM

## 2019-11-05 PROBLEM — I48.91 ATRIAL FIBRILLATION (HCC): Status: ACTIVE | Noted: 2019-11-05

## 2019-11-05 LAB
ALBUMIN SERPL-MCNC: 4.2 G/DL (ref 3.5–5.2)
ALBUMIN/GLOB SERPL: 1.8 G/DL
ALP SERPL-CCNC: 83 U/L (ref 39–117)
ALT SERPL W P-5'-P-CCNC: 16 U/L (ref 1–33)
ANION GAP SERPL CALCULATED.3IONS-SCNC: 15.2 MMOL/L (ref 5–15)
AORTIC DIMENSIONLESS INDEX: 0.8 (DI)
AST SERPL-CCNC: 19 U/L (ref 1–32)
BASOPHILS # BLD AUTO: 0.04 10*3/MM3 (ref 0–0.2)
BASOPHILS NFR BLD AUTO: 0.6 % (ref 0–1.5)
BH CV ECHO MEAS - ACS: 1.6 CM
BH CV ECHO MEAS - AI DEC SLOPE: 172 CM/SEC^2
BH CV ECHO MEAS - AI MAX PG: 36.9 MMHG
BH CV ECHO MEAS - AI MAX VEL: 302 CM/SEC
BH CV ECHO MEAS - AI P1/2T: 514.3 MSEC
BH CV ECHO MEAS - AO MAX PG: 6 MMHG
BH CV ECHO MEAS - AO MEAN PG (FULL): 3 MMHG
BH CV ECHO MEAS - AO MEAN PG: 4 MMHG
BH CV ECHO MEAS - AO ROOT AREA (BSA CORRECTED): 1.7
BH CV ECHO MEAS - AO ROOT AREA: 6.2 CM^2
BH CV ECHO MEAS - AO ROOT DIAM: 2.8 CM
BH CV ECHO MEAS - AO V2 MAX: 123 CM/SEC
BH CV ECHO MEAS - AO V2 MEAN: 95.8 CM/SEC
BH CV ECHO MEAS - AO V2 VTI: 30.7 CM
BH CV ECHO MEAS - ASC AORTA: 3 CM
BH CV ECHO MEAS - AVA(I,A): 1.4 CM^2
BH CV ECHO MEAS - AVA(I,D): 1.4 CM^2
BH CV ECHO MEAS - BSA(HAYCOCK): 1.7 M^2
BH CV ECHO MEAS - BSA: 1.7 M^2
BH CV ECHO MEAS - BZI_BMI: 23.3 KILOGRAMS/M^2
BH CV ECHO MEAS - BZI_METRIC_HEIGHT: 162.6 CM
BH CV ECHO MEAS - BZI_METRIC_WEIGHT: 61.7 KG
BH CV ECHO MEAS - EDV(CUBED): 74.1 ML
BH CV ECHO MEAS - EDV(MOD-SP2): 53 ML
BH CV ECHO MEAS - EDV(MOD-SP4): 65 ML
BH CV ECHO MEAS - EDV(TEICH): 78.6 ML
BH CV ECHO MEAS - EF(CUBED): 73.4 %
BH CV ECHO MEAS - EF(MOD-BP): 64 %
BH CV ECHO MEAS - EF(MOD-SP2): 64.2 %
BH CV ECHO MEAS - EF(MOD-SP4): 64.6 %
BH CV ECHO MEAS - EF(TEICH): 65.6 %
BH CV ECHO MEAS - ESV(CUBED): 19.7 ML
BH CV ECHO MEAS - ESV(MOD-SP2): 19 ML
BH CV ECHO MEAS - ESV(MOD-SP4): 23 ML
BH CV ECHO MEAS - ESV(TEICH): 27 ML
BH CV ECHO MEAS - FS: 35.7 %
BH CV ECHO MEAS - IVS/LVPW: 1.4
BH CV ECHO MEAS - IVSD: 1.4 CM
BH CV ECHO MEAS - LAT PEAK E' VEL: 9 CM/SEC
BH CV ECHO MEAS - LV DIASTOLIC VOL/BSA (35-75): 39.1 ML/M^2
BH CV ECHO MEAS - LV MASS(C)D: 178.2 GRAMS
BH CV ECHO MEAS - LV MASS(C)DI: 107.3 GRAMS/M^2
BH CV ECHO MEAS - LV MEAN PG: 1 MMHG
BH CV ECHO MEAS - LV SYSTOLIC VOL/BSA (12-30): 13.9 ML/M^2
BH CV ECHO MEAS - LV V1 MAX: 82 CM/SEC
BH CV ECHO MEAS - LV V1 MEAN: 55.8 CM/SEC
BH CV ECHO MEAS - LV V1 VTI: 18.3 CM
BH CV ECHO MEAS - LVIDD: 4.2 CM
BH CV ECHO MEAS - LVIDS: 2.7 CM
BH CV ECHO MEAS - LVLD AP2: 6.9 CM
BH CV ECHO MEAS - LVLD AP4: 6.9 CM
BH CV ECHO MEAS - LVLS AP2: 6.4 CM
BH CV ECHO MEAS - LVLS AP4: 6 CM
BH CV ECHO MEAS - LVOT AREA (M): 2.3 CM^2
BH CV ECHO MEAS - LVOT AREA: 2.3 CM^2
BH CV ECHO MEAS - LVOT DIAM: 1.7 CM
BH CV ECHO MEAS - LVPWD: 1 CM
BH CV ECHO MEAS - MED PEAK E' VEL: 8 CM/SEC
BH CV ECHO MEAS - MR MAX PG: 103.2 MMHG
BH CV ECHO MEAS - MR MAX VEL: 508 CM/SEC
BH CV ECHO MEAS - MV DEC SLOPE: 857 CM/SEC^2
BH CV ECHO MEAS - MV DEC TIME: 166 SEC
BH CV ECHO MEAS - MV E MAX VEL: 121 CM/SEC
BH CV ECHO MEAS - MV MEAN PG: 2 MMHG
BH CV ECHO MEAS - MV P1/2T MAX VEL: 129 CM/SEC
BH CV ECHO MEAS - MV P1/2T: 44.1 MSEC
BH CV ECHO MEAS - MV V2 MEAN: 64.3 CM/SEC
BH CV ECHO MEAS - MV V2 VTI: 13.4 CM
BH CV ECHO MEAS - MVA P1/2T LCG: 1.7 CM^2
BH CV ECHO MEAS - MVA(P1/2T): 5 CM^2
BH CV ECHO MEAS - MVA(VTI): 3.1 CM^2
BH CV ECHO MEAS - PA ACC SLOPE: 548 CM/SEC^2
BH CV ECHO MEAS - PA ACC TIME: 0.11 SEC
BH CV ECHO MEAS - PA MAX PG: 1.4 MMHG
BH CV ECHO MEAS - PA PR(ACCEL): 31.3 MMHG
BH CV ECHO MEAS - PA V2 MAX: 59.8 CM/SEC
BH CV ECHO MEAS - RAP SYSTOLE: 15 MMHG
BH CV ECHO MEAS - RVOT AREA: 2 CM^2
BH CV ECHO MEAS - RVOT DIAM: 1.6 CM
BH CV ECHO MEAS - RVSP: 45.9 MMHG
BH CV ECHO MEAS - SI(AO): 113.9 ML/M^2
BH CV ECHO MEAS - SI(CUBED): 32.8 ML/M^2
BH CV ECHO MEAS - SI(LVOT): 25 ML/M^2
BH CV ECHO MEAS - SI(MOD-SP2): 20.5 ML/M^2
BH CV ECHO MEAS - SI(MOD-SP4): 25.3 ML/M^2
BH CV ECHO MEAS - SI(TEICH): 31.1 ML/M^2
BH CV ECHO MEAS - SV(AO): 189 ML
BH CV ECHO MEAS - SV(CUBED): 54.4 ML
BH CV ECHO MEAS - SV(LVOT): 41.5 ML
BH CV ECHO MEAS - SV(MOD-SP2): 34 ML
BH CV ECHO MEAS - SV(MOD-SP4): 42 ML
BH CV ECHO MEAS - SV(TEICH): 51.6 ML
BH CV ECHO MEAS - TAPSE (>1.6): 2.5 CM2
BH CV ECHO MEAS - TR MAX PG: 31
BH CV ECHO MEAS - TR MAX VEL: 278 CM/SEC
BH CV ECHO MEASUREMENTS AVERAGE E/E' RATIO: 14.24
BH CV VAS BP RIGHT ARM: NORMAL MMHG
BH CV XLRA - RV BASE: 3.8 CM
BH CV XLRA - TDI S': 9.75 CM/SEC
BILIRUB SERPL-MCNC: 0.4 MG/DL (ref 0.2–1.2)
BUN BLD-MCNC: 16 MG/DL (ref 8–23)
BUN/CREAT SERPL: 16.5 (ref 7–25)
CALCIUM SPEC-SCNC: 8.8 MG/DL (ref 8.6–10.5)
CHLORIDE SERPL-SCNC: 104 MMOL/L (ref 98–107)
CHOLEST SERPL-MCNC: 88 MG/DL (ref 0–200)
CO2 SERPL-SCNC: 22.8 MMOL/L (ref 22–29)
CREAT BLD-MCNC: 0.97 MG/DL (ref 0.57–1)
DEPRECATED RDW RBC AUTO: 44.4 FL (ref 37–54)
EOSINOPHIL # BLD AUTO: 0.05 10*3/MM3 (ref 0–0.4)
EOSINOPHIL NFR BLD AUTO: 0.8 % (ref 0.3–6.2)
ERYTHROCYTE [DISTWIDTH] IN BLOOD BY AUTOMATED COUNT: 13.4 % (ref 12.3–15.4)
GFR SERPL CREATININE-BSD FRML MDRD: 56 ML/MIN/1.73
GLOBULIN UR ELPH-MCNC: 2.4 GM/DL
GLUCOSE BLD-MCNC: 108 MG/DL (ref 65–99)
HBA1C MFR BLD: 6.3 % (ref 4.8–5.6)
HCT VFR BLD AUTO: 34 % (ref 34–46.6)
HDLC SERPL-MCNC: 40 MG/DL (ref 40–60)
HGB BLD-MCNC: 10.5 G/DL (ref 12–15.9)
IMM GRANULOCYTES # BLD AUTO: 0.03 10*3/MM3 (ref 0–0.05)
IMM GRANULOCYTES NFR BLD AUTO: 0.5 % (ref 0–0.5)
LDLC SERPL CALC-MCNC: 32 MG/DL (ref 0–100)
LDLC/HDLC SERPL: 0.79 {RATIO}
LEFT ATRIUM VOLUME INDEX: 53.3 ML/M2
LYMPHOCYTES # BLD AUTO: 2.35 10*3/MM3 (ref 0.7–3.1)
LYMPHOCYTES NFR BLD AUTO: 37.2 % (ref 19.6–45.3)
MAGNESIUM SERPL-MCNC: 1.3 MG/DL (ref 1.6–2.4)
MAXIMAL PREDICTED HEART RATE: 145 BPM
MCH RBC QN AUTO: 27.6 PG (ref 26.6–33)
MCHC RBC AUTO-ENTMCNC: 30.9 G/DL (ref 31.5–35.7)
MCV RBC AUTO: 89.2 FL (ref 79–97)
MONOCYTES # BLD AUTO: 0.65 10*3/MM3 (ref 0.1–0.9)
MONOCYTES NFR BLD AUTO: 10.3 % (ref 5–12)
NEUTROPHILS # BLD AUTO: 3.19 10*3/MM3 (ref 1.7–7)
NEUTROPHILS NFR BLD AUTO: 50.6 % (ref 42.7–76)
NRBC BLD AUTO-RTO: 0 /100 WBC (ref 0–0.2)
NT-PROBNP SERPL-MCNC: 2549 PG/ML (ref 5–1800)
PLATELET # BLD AUTO: 283 10*3/MM3 (ref 140–450)
PMV BLD AUTO: 10.6 FL (ref 6–12)
POTASSIUM BLD-SCNC: 3.4 MMOL/L (ref 3.5–5.2)
PROT SERPL-MCNC: 6.6 G/DL (ref 6–8.5)
RBC # BLD AUTO: 3.81 10*6/MM3 (ref 3.77–5.28)
SODIUM BLD-SCNC: 142 MMOL/L (ref 136–145)
STRESS TARGET HR: 123 BPM
TRIGL SERPL-MCNC: 82 MG/DL (ref 0–150)
TSH SERPL DL<=0.05 MIU/L-ACNC: 6.9 UIU/ML (ref 0.27–4.2)
VLDLC SERPL-MCNC: 16.4 MG/DL (ref 5–40)
WBC NRBC COR # BLD: 6.31 10*3/MM3 (ref 3.4–10.8)

## 2019-11-05 PROCEDURE — 96375 TX/PRO/DX INJ NEW DRUG ADDON: CPT

## 2019-11-05 PROCEDURE — 80053 COMPREHEN METABOLIC PANEL: CPT | Performed by: HOSPITALIST

## 2019-11-05 PROCEDURE — 83036 HEMOGLOBIN GLYCOSYLATED A1C: CPT | Performed by: NURSE PRACTITIONER

## 2019-11-05 PROCEDURE — 93010 ELECTROCARDIOGRAM REPORT: CPT | Performed by: INTERNAL MEDICINE

## 2019-11-05 PROCEDURE — 83880 ASSAY OF NATRIURETIC PEPTIDE: CPT | Performed by: HOSPITALIST

## 2019-11-05 PROCEDURE — 25010000002 PROPOFOL 10 MG/ML EMULSION: Performed by: ANESTHESIOLOGY

## 2019-11-05 PROCEDURE — 92960 CARDIOVERSION ELECTRIC EXT: CPT | Performed by: INTERNAL MEDICINE

## 2019-11-05 PROCEDURE — 92960 CARDIOVERSION ELECTRIC EXT: CPT

## 2019-11-05 PROCEDURE — 99217 PR OBSERVATION CARE DISCHARGE MANAGEMENT: CPT | Performed by: NURSE PRACTITIONER

## 2019-11-05 PROCEDURE — 96366 THER/PROPH/DIAG IV INF ADDON: CPT

## 2019-11-05 PROCEDURE — 25010000003 LIDOCAINE 1 % SOLUTION: Performed by: ANESTHESIOLOGY

## 2019-11-05 PROCEDURE — G0378 HOSPITAL OBSERVATION PER HR: HCPCS

## 2019-11-05 PROCEDURE — 25010000002 ONDANSETRON PER 1 MG: Performed by: NURSE PRACTITIONER

## 2019-11-05 PROCEDURE — 25010000002 AMIODARONE IN DEXTROSE 5% 360-4.14 MG/200ML-% SOLUTION: Performed by: NURSE PRACTITIONER

## 2019-11-05 PROCEDURE — 85025 COMPLETE CBC W/AUTO DIFF WBC: CPT | Performed by: NURSE PRACTITIONER

## 2019-11-05 PROCEDURE — 93005 ELECTROCARDIOGRAM TRACING: CPT | Performed by: NURSE PRACTITIONER

## 2019-11-05 PROCEDURE — 84443 ASSAY THYROID STIM HORMONE: CPT | Performed by: HOSPITALIST

## 2019-11-05 PROCEDURE — 93005 ELECTROCARDIOGRAM TRACING: CPT | Performed by: INTERNAL MEDICINE

## 2019-11-05 PROCEDURE — 83735 ASSAY OF MAGNESIUM: CPT | Performed by: NURSE PRACTITIONER

## 2019-11-05 PROCEDURE — 25010000002 MAGNESIUM SULFATE 2 GM/50ML SOLUTION: Performed by: INTERNAL MEDICINE

## 2019-11-05 PROCEDURE — 80061 LIPID PANEL: CPT | Performed by: NURSE PRACTITIONER

## 2019-11-05 RX ORDER — AMLODIPINE BESYLATE 5 MG/1
5 TABLET ORAL
Status: DISCONTINUED | OUTPATIENT
Start: 2019-11-06 | End: 2019-11-05 | Stop reason: HOSPADM

## 2019-11-05 RX ORDER — POTASSIUM CHLORIDE 750 MG/1
40 CAPSULE, EXTENDED RELEASE ORAL AS NEEDED
Status: DISCONTINUED | OUTPATIENT
Start: 2019-11-05 | End: 2019-11-05 | Stop reason: HOSPADM

## 2019-11-05 RX ORDER — AMIODARONE HYDROCHLORIDE 200 MG/1
200 TABLET ORAL EVERY 12 HOURS SCHEDULED
Status: DISCONTINUED | OUTPATIENT
Start: 2019-11-05 | End: 2019-11-05 | Stop reason: HOSPADM

## 2019-11-05 RX ORDER — LOSARTAN POTASSIUM 100 MG/1
100 TABLET ORAL
Status: DISCONTINUED | OUTPATIENT
Start: 2019-11-06 | End: 2019-11-05 | Stop reason: HOSPADM

## 2019-11-05 RX ORDER — AMLODIPINE BESYLATE 5 MG/1
5 TABLET ORAL ONCE
Status: DISCONTINUED | OUTPATIENT
Start: 2019-11-05 | End: 2019-11-05

## 2019-11-05 RX ORDER — MAGNESIUM SULFATE HEPTAHYDRATE 40 MG/ML
2 INJECTION, SOLUTION INTRAVENOUS AS NEEDED
Status: DISCONTINUED | OUTPATIENT
Start: 2019-11-05 | End: 2019-11-05 | Stop reason: HOSPADM

## 2019-11-05 RX ORDER — SODIUM CHLORIDE 0.9 % (FLUSH) 0.9 %
3 SYRINGE (ML) INJECTION EVERY 12 HOURS SCHEDULED
Status: DISCONTINUED | OUTPATIENT
Start: 2019-11-05 | End: 2019-11-05 | Stop reason: HOSPADM

## 2019-11-05 RX ORDER — ONDANSETRON 2 MG/ML
4 INJECTION INTRAMUSCULAR; INTRAVENOUS EVERY 6 HOURS PRN
Status: DISCONTINUED | OUTPATIENT
Start: 2019-11-05 | End: 2019-11-05 | Stop reason: HOSPADM

## 2019-11-05 RX ORDER — SODIUM CHLORIDE, SODIUM LACTATE, POTASSIUM CHLORIDE, CALCIUM CHLORIDE 600; 310; 30; 20 MG/100ML; MG/100ML; MG/100ML; MG/100ML
9 INJECTION, SOLUTION INTRAVENOUS CONTINUOUS
Status: CANCELLED | OUTPATIENT
Start: 2019-11-05

## 2019-11-05 RX ORDER — SODIUM CHLORIDE 0.9 % (FLUSH) 0.9 %
3-10 SYRINGE (ML) INJECTION AS NEEDED
Status: CANCELLED | OUTPATIENT
Start: 2019-11-05

## 2019-11-05 RX ORDER — MAGNESIUM SULFATE HEPTAHYDRATE 40 MG/ML
4 INJECTION, SOLUTION INTRAVENOUS AS NEEDED
Status: DISCONTINUED | OUTPATIENT
Start: 2019-11-05 | End: 2019-11-05 | Stop reason: HOSPADM

## 2019-11-05 RX ORDER — METOPROLOL TARTRATE 50 MG/1
50 TABLET, FILM COATED ORAL EVERY 12 HOURS SCHEDULED
Qty: 60 TABLET | Refills: 11 | Status: SHIPPED | OUTPATIENT
Start: 2019-11-05 | End: 2019-12-10

## 2019-11-05 RX ORDER — LIDOCAINE HYDROCHLORIDE 10 MG/ML
INJECTION, SOLUTION INFILTRATION; PERINEURAL AS NEEDED
Status: DISCONTINUED | OUTPATIENT
Start: 2019-11-05 | End: 2019-11-05 | Stop reason: SURG

## 2019-11-05 RX ORDER — MIDAZOLAM HYDROCHLORIDE 1 MG/ML
1 INJECTION INTRAMUSCULAR; INTRAVENOUS
Status: CANCELLED | OUTPATIENT
Start: 2019-11-05

## 2019-11-05 RX ORDER — MIDAZOLAM HYDROCHLORIDE 1 MG/ML
2 INJECTION INTRAMUSCULAR; INTRAVENOUS
Status: DISCONTINUED | OUTPATIENT
Start: 2019-11-05 | End: 2019-11-05 | Stop reason: HOSPADM

## 2019-11-05 RX ORDER — POTASSIUM CHLORIDE 1.5 G/1.77G
40 POWDER, FOR SOLUTION ORAL AS NEEDED
Status: DISCONTINUED | OUTPATIENT
Start: 2019-11-05 | End: 2019-11-05 | Stop reason: HOSPADM

## 2019-11-05 RX ORDER — PROPOFOL 10 MG/ML
VIAL (ML) INTRAVENOUS AS NEEDED
Status: DISCONTINUED | OUTPATIENT
Start: 2019-11-05 | End: 2019-11-05 | Stop reason: SURG

## 2019-11-05 RX ORDER — PROPOFOL 10 MG/ML
VIAL (ML) INTRAVENOUS CONTINUOUS PRN
Status: DISCONTINUED | OUTPATIENT
Start: 2019-11-05 | End: 2019-11-05

## 2019-11-05 RX ORDER — SODIUM CHLORIDE 0.9 % (FLUSH) 0.9 %
3 SYRINGE (ML) INJECTION EVERY 12 HOURS SCHEDULED
Status: CANCELLED | OUTPATIENT
Start: 2019-11-05

## 2019-11-05 RX ORDER — SODIUM CHLORIDE, SODIUM LACTATE, POTASSIUM CHLORIDE, CALCIUM CHLORIDE 600; 310; 30; 20 MG/100ML; MG/100ML; MG/100ML; MG/100ML
9 INJECTION, SOLUTION INTRAVENOUS CONTINUOUS
Status: DISCONTINUED | OUTPATIENT
Start: 2019-11-05 | End: 2019-11-05 | Stop reason: HOSPADM

## 2019-11-05 RX ORDER — LOSARTAN POTASSIUM 100 MG/1
100 TABLET ORAL
Qty: 30 TABLET | Refills: 11 | Status: SHIPPED | OUTPATIENT
Start: 2019-11-06 | End: 2020-09-30 | Stop reason: SDUPTHER

## 2019-11-05 RX ORDER — AMLODIPINE BESYLATE 10 MG/1
10 TABLET ORAL
Status: DISCONTINUED | OUTPATIENT
Start: 2019-11-06 | End: 2019-11-05

## 2019-11-05 RX ORDER — AMIODARONE HYDROCHLORIDE 200 MG/1
200 TABLET ORAL EVERY 12 HOURS SCHEDULED
Qty: 60 TABLET | Refills: 1 | Status: ON HOLD | OUTPATIENT
Start: 2019-11-05 | End: 2020-01-11

## 2019-11-05 RX ORDER — MIDAZOLAM HYDROCHLORIDE 1 MG/ML
1 INJECTION INTRAMUSCULAR; INTRAVENOUS
Status: DISCONTINUED | OUTPATIENT
Start: 2019-11-05 | End: 2019-11-05 | Stop reason: HOSPADM

## 2019-11-05 RX ORDER — MIDAZOLAM HYDROCHLORIDE 1 MG/ML
2 INJECTION INTRAMUSCULAR; INTRAVENOUS
Status: CANCELLED | OUTPATIENT
Start: 2019-11-05

## 2019-11-05 RX ORDER — SODIUM CHLORIDE, SODIUM LACTATE, POTASSIUM CHLORIDE, CALCIUM CHLORIDE 600; 310; 30; 20 MG/100ML; MG/100ML; MG/100ML; MG/100ML
INJECTION, SOLUTION INTRAVENOUS CONTINUOUS PRN
Status: DISCONTINUED | OUTPATIENT
Start: 2019-11-05 | End: 2019-11-05 | Stop reason: SURG

## 2019-11-05 RX ORDER — SODIUM CHLORIDE 0.9 % (FLUSH) 0.9 %
3-10 SYRINGE (ML) INJECTION AS NEEDED
Status: DISCONTINUED | OUTPATIENT
Start: 2019-11-05 | End: 2019-11-05 | Stop reason: HOSPADM

## 2019-11-05 RX ORDER — LOSARTAN POTASSIUM 50 MG/1
50 TABLET ORAL ONCE
Status: COMPLETED | OUTPATIENT
Start: 2019-11-05 | End: 2019-11-05

## 2019-11-05 RX ADMIN — APIXABAN 5 MG: 5 TABLET, FILM COATED ORAL at 10:54

## 2019-11-05 RX ADMIN — METOPROLOL TARTRATE 50 MG: 50 TABLET, FILM COATED ORAL at 10:52

## 2019-11-05 RX ADMIN — AMLODIPINE BESYLATE 5 MG: 5 TABLET ORAL at 10:54

## 2019-11-05 RX ADMIN — LIDOCAINE HYDROCHLORIDE 50 ML: 10 INJECTION, SOLUTION INFILTRATION; PERINEURAL at 09:09

## 2019-11-05 RX ADMIN — ONDANSETRON 4 MG: 2 INJECTION INTRAMUSCULAR; INTRAVENOUS at 17:35

## 2019-11-05 RX ADMIN — POTASSIUM CHLORIDE 40 MEQ: 750 CAPSULE, EXTENDED RELEASE ORAL at 12:37

## 2019-11-05 RX ADMIN — SODIUM CHLORIDE, POTASSIUM CHLORIDE, SODIUM LACTATE AND CALCIUM CHLORIDE: 600; 310; 30; 20 INJECTION, SOLUTION INTRAVENOUS at 09:09

## 2019-11-05 RX ADMIN — VILAZODONE HYDROCHLORIDE 10 MG: 10 TABLET ORAL at 10:52

## 2019-11-05 RX ADMIN — LOSARTAN POTASSIUM 50 MG: 50 TABLET, FILM COATED ORAL at 10:53

## 2019-11-05 RX ADMIN — HYDROXYCHLOROQUINE SULFATE 200 MG: 200 TABLET, FILM COATED ORAL at 10:54

## 2019-11-05 RX ADMIN — PROPOFOL 70 MG: 10 INJECTION, EMULSION INTRAVENOUS at 09:09

## 2019-11-05 RX ADMIN — SODIUM CHLORIDE, PRESERVATIVE FREE 10 ML: 5 INJECTION INTRAVENOUS at 10:55

## 2019-11-05 RX ADMIN — AMIODARONE HYDROCHLORIDE 200 MG: 200 TABLET ORAL at 15:56

## 2019-11-05 RX ADMIN — AMIODARONE HYDROCHLORIDE 0.5 MG/MIN: 1.8 INJECTION, SOLUTION INTRAVENOUS at 04:46

## 2019-11-05 RX ADMIN — MAGNESIUM SULFATE 2 G: 2 INJECTION INTRAVENOUS at 14:02

## 2019-11-05 RX ADMIN — MAGNESIUM SULFATE 2 G: 2 INJECTION INTRAVENOUS at 15:23

## 2019-11-05 RX ADMIN — PANTOPRAZOLE SODIUM 40 MG: 40 TABLET, DELAYED RELEASE ORAL at 06:52

## 2019-11-05 RX ADMIN — LOSARTAN POTASSIUM 50 MG: 50 TABLET, FILM COATED ORAL at 18:42

## 2019-11-05 RX ADMIN — POTASSIUM CHLORIDE 40 MEQ: 750 CAPSULE, EXTENDED RELEASE ORAL at 06:52

## 2019-11-05 NOTE — PLAN OF CARE
Problem: Patient Care Overview  Goal: Plan of Care Review  Outcome: Ongoing (interventions implemented as appropriate)   11/05/19 0214   Coping/Psychosocial   Plan of Care Reviewed With patient   Plan of Care Review   Progress improving   OTHER   Outcome Summary Pt. came in for a-fib currently running a-fib on heart monitor. Planning on cardiversion today if still in fib this AM. On amniodarone gtt at 0.5mg/min. No complaints of chest pain will continue to monitor.

## 2019-11-05 NOTE — ANESTHESIA POSTPROCEDURE EVALUATION
Patient: Radha Alfred    Procedure Summary     Date:  11/05/19 Room / Location:  [facility] PACU    Anesthesia Start:  0909 Anesthesia Stop:  0920    Procedure:  CARDIOVERSION EXTERNAL IN CARDIOLOGY DEPARTMENT Diagnosis:       Paroxysmal atrial fibrillation (CMS/HCC)      (afib)    Scheduled Providers:   Provider:  Edel Ferrara MD    Anesthesia Type:  MAC ASA Status:  3          Anesthesia Type: MAC  Last vitals  BP   (!) 157/102 (11/05/19 0900)   Temp   36.5 °C (97.7 °F) (11/05/19 0704)   Pulse   116 (11/05/19 0900)   Resp   16 (11/05/19 0810)     SpO2   99 % (11/05/19 0900)     Post Anesthesia Care and Evaluation    Patient location during evaluation: PACU  Patient participation: complete - patient participated  Level of consciousness: awake and alert  Pain management: adequate  Airway patency: patent  Anesthetic complications: No anesthetic complications    Cardiovascular status: acceptable  Respiratory status: acceptable  Hydration status: acceptable    Comments: ---------------------            11/05/19 0900      ---------------------   BP:     (!) 157/102   Pulse:      116       Resp:                 Temp:                 SpO2:       99%      ---------------------

## 2019-11-05 NOTE — ANESTHESIA PREPROCEDURE EVALUATION
Anesthesia Evaluation     no history of anesthetic complications:               Airway   Mallampati: I  TM distance: >3 FB  Neck ROM: full  Dental - normal exam     Pulmonary    (+) a smoker Former,   (-) shortness of breath  Cardiovascular     (+) hypertension, valvular problems/murmurs MVP, CABG, dysrhythmias Atrial Fib, PVC, angina, KEMP, PVD, hyperlipidemia,     ROS comment: Ant MI    Neuro/Psych  (+) CVA (R side slightly weak),     GI/Hepatic/Renal/Endo    (+)  GERD,  diabetes mellitus,     Musculoskeletal     Abdominal    Substance History      OB/GYN          Other   arthritis,                      Anesthesia Plan    ASA 3     MAC     intravenous induction   Anesthetic plan, all risks, benefits, and alternatives have been provided, discussed and informed consent has been obtained with: patient.

## 2019-11-05 NOTE — PROGRESS NOTES
"Daily progress note    Chief complaint  Doing better  No new complaints  Status post cardioversion  Family at bedside    History of present illness  74-year-old white female with history of chronic atrial fibrillation CVA diabetes hypertension hyperlipidemia peripheral vascular disease and gastroesophageal reflux disease admitted to cardiology service with complaint of palpitation and work-up revealed in cardiology office rapid ventricular rate admitted for cardioversion and I am asked to follow the patient for medical problems.  At the time of interview she still having palpitation but denies any chest pain or shortness of breath.  Patient also denies any fever chills.  No other complaints.    Review of systems  As above    Physical examination  Blood pressure 168/85, pulse 72, temperature 97.8 °F (36.6 °C), temperature source Oral, resp. rate 16, height 162.6 cm (64\"), weight 61.7 kg (136 lb), SpO2 97 %.    HEENT unremarkable  Neck supple  Lungs decreased breath sound bilaterally no wheezes rhonchi or crackles  Heart irregular S1-S2  Abdomen soft nontender bowel is positive  Extremities 2+ edema  Neuro moves all 4 extremities no focal deficit  Psych normal mood and behavior    LABS  Lab Results (last 24 hours)     Procedure Component Value Units Date/Time    Hemoglobin A1c [[id number]]  (Abnormal) Collected:  11/05/19 0508    Specimen:  Blood Updated:  11/05/19 0740     Hemoglobin A1C 6.30 %     Narrative:       Hemoglobin A1C Ranges:    Increased Risk for Diabetes  5.7% to 6.4%  Diabetes                     >= 6.5%  Diabetic Goal                < 7.0%    TSH [[id number]]  (Abnormal) Collected:  11/05/19 0508    Specimen:  Blood Updated:  11/05/19 0623     TSH 6.900 uIU/mL     BNP [[id number]]  (Abnormal) Collected:  11/05/19 0508    Specimen:  Blood Updated:  11/05/19 0623     proBNP 2,549.0 pg/mL     Narrative:       Among patients with dyspnea, NT-proBNP is highly sensitive for the detection of acute congestive " heart failure. In addition NT-proBNP of <300 pg/ml effectively rules out acute congestive heart failure with 99% negative predictive value.    Comprehensive Metabolic Panel [[id number]]  (Abnormal) Collected:  11/05/19 0508    Specimen:  Blood Updated:  11/05/19 0612     Glucose 108 mg/dL      BUN 16 mg/dL      Creatinine 0.97 mg/dL      Sodium 142 mmol/L      Potassium 3.4 mmol/L      Chloride 104 mmol/L      CO2 22.8 mmol/L      Calcium 8.8 mg/dL      Total Protein 6.6 g/dL      Albumin 4.20 g/dL      ALT (SGPT) 16 U/L      AST (SGOT) 19 U/L      Alkaline Phosphatase 83 U/L      Total Bilirubin 0.4 mg/dL      eGFR Non African Amer 56 mL/min/1.73      Globulin 2.4 gm/dL      A/G Ratio 1.8 g/dL      BUN/Creatinine Ratio 16.5     Anion Gap 15.2 mmol/L     Narrative:       GFR Normal >60  Chronic Kidney Disease <60  Kidney Failure <15    Lipid Panel [[id number]] Collected:  11/05/19 0508    Specimen:  Blood Updated:  11/05/19 0612     Total Cholesterol 88 mg/dL      Triglycerides 82 mg/dL      HDL Cholesterol 40 mg/dL      LDL Cholesterol  32 mg/dL      VLDL Cholesterol 16.4 mg/dL      LDL/HDL Ratio 0.79    Narrative:       Cholesterol Reference Ranges  (U.S. Department of Health and Human Services ATP III Classifications)    Desirable          <200 mg/dL  Borderline High    200-239 mg/dL  High Risk          >240 mg/dL      Triglyceride Reference Ranges  (U.S. Department of Health and Human Services ATP III Classifications)    Normal           <150 mg/dL  Borderline High  150-199 mg/dL  High             200-499 mg/dL  Very High        >500 mg/dL    HDL Reference Ranges  (U.S. Department of Health and Human Services ATP III Classifcations)    Low     <40 mg/dl (major risk factor for CHD)  High    >60 mg/dl ('negative' risk factor for CHD)        LDL Reference Ranges  (U.S. Department of Health and Human Services ATP III Classifcations)    Optimal          <100 mg/dL  Near Optimal     100-129 mg/dL  Borderline High   130-159 mg/dL  High             160-189 mg/dL  Very High        >189 mg/dL    Magnesium [[id number]]  (Abnormal) Collected:  11/05/19 0508    Specimen:  Blood Updated:  11/05/19 0612     Magnesium 1.3 mg/dL     CBC & Differential [[id number]] Collected:  11/05/19 0508    Specimen:  Blood Updated:  11/05/19 0558    Narrative:       The following orders were created for panel order CBC & Differential.  Procedure                               Abnormality         Status                     ---------                               -----------         ------                     CBC Auto Differential[[id number]]        Abnormal            Final result                 Please view results for these tests on the individual orders.    CBC Auto Differential [[id number]]  (Abnormal) Collected:  11/05/19 0508    Specimen:  Blood Updated:  11/05/19 0558     WBC 6.31 10*3/mm3      RBC 3.81 10*6/mm3      Hemoglobin 10.5 g/dL      Hematocrit 34.0 %      MCV 89.2 fL      MCH 27.6 pg      MCHC 30.9 g/dL      RDW 13.4 %      RDW-SD 44.4 fl      MPV 10.6 fL      Platelets 283 10*3/mm3      Neutrophil % 50.6 %      Lymphocyte % 37.2 %      Monocyte % 10.3 %      Eosinophil % 0.8 %      Basophil % 0.6 %      Immature Grans % 0.5 %      Neutrophils, Absolute 3.19 10*3/mm3      Lymphocytes, Absolute 2.35 10*3/mm3      Monocytes, Absolute 0.65 10*3/mm3      Eosinophils, Absolute 0.05 10*3/mm3      Basophils, Absolute 0.04 10*3/mm3      Immature Grans, Absolute 0.03 10*3/mm3      nRBC 0.0 /100 WBC         Imaging Results (Last 24 Hours)     ** No results found for the last 24 hours. **        ECG 12 Lead        HEART RATE= 94  bpm  RR Interval= 636  ms  ND Interval=   ms  P Horizontal Axis=   deg  P Front Axis=   deg  QRSD Interval= 85  ms  QT Interval= 386  ms  QRS Axis= -7  deg  T Wave Axis= 175  deg  - ABNORMAL ECG -  Atrial fibrillation  Ventricular premature complex  Anterior infarct, old  Nonspecific T abnormalities, lateral leads              Current Facility-Administered Medications:   •  amLODIPine (NORVASC) tablet 5 mg, 5 mg, Oral, Q24H, Shreya Reyes APRN, 5 mg at 11/05/19 1054  •  apixaban (ELIQUIS) tablet 5 mg, 5 mg, Oral, Q12H, Shreya Reyes, APRN, 5 mg at 11/05/19 1054  •  atorvastatin (LIPITOR) tablet 40 mg, 40 mg, Oral, Nightly, Shreya Reyes, APRN, 40 mg at 11/04/19 2031  •  cycloSPORINE (RESTASIS) 0.05 % ophthalmic emulsion 1 drop, 1 drop, Both Eyes, BID, Shreya Reyes APRN  •  hydroxychloroquine (PLAQUENIL) tablet 200 mg, 200 mg, Oral, Q24H, Shreya Reyes APRN, 200 mg at 11/05/19 1054  •  lactated ringers infusion, 9 mL/hr, Intravenous, Continuous, Nicole More MD  •  losartan (COZAAR) tablet 50 mg, 50 mg, Oral, Q24H, Shreya Reyes, APRN, 50 mg at 11/05/19 1053  •  Magnesium Sulfate 2 gram Bolus, followed by 8 gram infusion (total Mg dose 10 grams)- Mg less than or equal to 1mg/dL, 2 g, Intravenous, PRN **OR** Magnesium Sulfate 2 gram / 50mL Infusion (GIVE X 3 BAGS TO EQUAL 6GM TOTAL DOSE) - Mg 1.1 - 1.5 mg/dl, 2 g, Intravenous, PRN **OR** Magnesium Sulfate 4 gram infusion- Mg 1.6-1.9 mg/dL, 4 g, Intravenous, PRN, More Levy MD  •  melatonin tablet 5 mg, 5 mg, Oral, Nightly PRN, Reginald Gonzales MD  •  metoprolol tartrate (LOPRESSOR) tablet 50 mg, 50 mg, Oral, Q12H, Shreya Reyes, APRN, 50 mg at 11/05/19 1052  •  midazolam (VERSED) injection 1 mg, 1 mg, Intravenous, Q5 Min PRN **OR** midazolam (VERSED) injection 2 mg, 2 mg, Intravenous, Q5 Min PRN, Nicole More MD  •  nitroglycerin (NITROSTAT) SL tablet 0.4 mg, 0.4 mg, Sublingual, Q5 Min PRN, Shreya Reyes APRN  •  pantoprazole (PROTONIX) EC tablet 40 mg, 40 mg, Oral, QAM, Shreya Reyes APRN, 40 mg at 11/05/19 0652  •  potassium chloride (KLOR-CON) packet 40 mEq, 40 mEq, Oral, PRN, Kiran Burnett MD  •  potassium chloride (MICRO-K) CR capsule 40 mEq, 40 mEq, Oral, PRN, Kiran Burnett MD, 40 mEq at 11/05/19 1237  •   QUEtiapine (SEROquel) tablet 12.5 mg, 12.5 mg, Oral, Nightly, Shreya Reyes, APRN, 12.5 mg at 11/04/19 2031  •  sodium chloride 0.9 % flush 10 mL, 10 mL, Intravenous, Q12H, Shreya Reyes APRN, 10 mL at 11/05/19 1055  •  sodium chloride 0.9 % flush 10 mL, 10 mL, Intravenous, PRN, Shreya Reyes APRN  •  sodium chloride 0.9 % flush 3 mL, 3 mL, Intravenous, Q12H, Nicole More MD  •  sodium chloride 0.9 % flush 3-10 mL, 3-10 mL, Intravenous, PRN, Nicole More MD  •  vilazodone (VIIBRYD) tablet 10 mg, 10 mg, Oral, Daily, Shreya Reyes APRN, 10 mg at 11/05/19 1052    ASSESSMENT  Paroxysmal atrial fibrillation with rapid ventricular rate status post cardioversion  Hypertension  Hyperlipidemia  Peripheral vascular disease  Status post CVA with no residual weakness  Rheumatoid arthritis on Plaquenil  Chronic anemia  Gastroesophageal reflux disease    PLAN  CPM  Controlled heart rate  Anticoagulation  Replace potassium and magnesium  Adjust home medications  Stress ulcer DVT prophylaxis  Supportive care  Discussed with family  Will follow with Dr. MEENA DURAN MD

## 2019-11-05 NOTE — DISCHARGE SUMMARY
[facility]  Physician Discharge Summary    Patient Identification:  Name: Radha Alfred  Age: 74 y.o.  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)   • Type 2 diabetes mellitus (CMS/HCC)   • Encounter for screening for diabetes mellitus   • Hypertension   • Disorder of foot   • History of cardiac catheterization   • History of surgical procedure   • Displacement of lumbar intervertebral disc without myelopathy   • Heparin-induced thrombocytopenia (CMS/HCC)   • Peripheral vascular disease (CMS/HCC)   • Spinal stenosis   • Urinary tract bacterial infections   • Small bowel obstruction (CMS/HCC)   • History of cholecystectomy   • Postprocedural state   • Rheumatoid arthritis (CMS/HCC)   • Incisional hernia   • Pain   • Need for vaccination   • Polypharmacy   • Disorder of lipid metabolism   • Memory loss   • Hyperlipidemia   • Anticoagulated   • Paroxysmal atrial fibrillation (CMS/HCC)   • Atrial fibrillation (CMS/HCC)       Discharged Condition: fair    Hospital Course: This 74-year-old female was directly admitted for external cardioversion and amiodarone initiation on .  History to include anxiety, cerebral infarction, DM 2, hypertension, hyperlipidemia, rheumatoid arthritis, paroxysmal atrial for ablation, chronic anticoagulation.  Amiodarone drip was started, labs were drawn, patient was found to be hypomagnesemic.  Mag  "replaced IV.  Potassium was low the next day, this was replaced as well.  Cardioversion was successful, she is now sinus rhythm.  She is to go home on a decreased dose of beta-blockade metoprolol 50 mg twice daily rather than 3 times daily, and amiodarone 200 mg twice daily until follow-up appointment at which point this will be reduced to 200 mg once daily.  Losartan was increased given her hypertension with reduced dose of beta-blockade to 100 mg daily.  She is to follow-up within 1 week with PCP regarding electrolyte imbalance.  I suspect that her electrode imbalance is due to her chronic diarrhea, she has not seen GI in several years regarding this.  Amatory referral sent to GI patient advised to follow-up within 1 month regarding this.  She is on Imodium which she takes 3 times daily for this.  She is feeling much better since her cardioversion with no chest pain, shortness of breath, dizziness, weakness, syncope, or near syncope.  Echocardiogram was done this admission which revealed mild MR/TR, moderate dilation of LAC, EF 64% with no evidence of pericardial effusion.  Patient is to undergo stress testing outpatient which is going to be set up for follow-up appointment.  This was discussed with the patient and she is in agreement with this plan.  Internal medicine was consulted for management of comorbidities throughout admission.  BNP was slightly elevated at 2400 this admission, suspect this is also due to her atrial fibrillation which is lasted approximately 10 days with elevated rate as high as 130s.  She appears euvolemic on exam today.  We will continue her as needed p.o. Lasix at home.    Consults:   IP CONSULT TO INTERNAL MEDICINE             Physical Exam  /85 (BP Location: Left arm, Patient Position: Lying)   Pulse 72   Temp 97.8 °F (36.6 °C) (Oral)   Resp 16   Ht 162.6 cm (64\")   Wt 61.7 kg (136 lb)   SpO2 97%   BMI 23.34 kg/m²     General appearance: No acute changes   Eyes: Sclera " conjunctiva normal, pupils reactive   HENT: Atraumatic; oropharynx clear with moist mucous membranes and no mucosal ulcerations;  Neck: Trachea midline; NECK, supple, no thyromegaly or lymphadenopathy   Lungs: Normal size and shape, normal breath sounds, equal distribution of air, no rales and rhonchi   CV: S1-S2 regular, no murmurs, no rub, no gallop   Abdomen: Soft, non-tender; no masses , no abnormal abdominal sounds   Extremities: No deformity , normal color , no peripheral edema   Skin: Normal temperature, turgor and texture; no rash, ulcers  Psych: Appropriate affect, alert and oriented to person, place and time             LABS:      Results from last 7 days   Lab Units 11/05/19  0508   MAGNESIUM mg/dL 1.3*     Results from last 7 days   Lab Units 11/05/19  0508   SODIUM mmol/L 142   POTASSIUM mmol/L 3.4*   BUN mg/dL 16   CREATININE mg/dL 0.97   CALCIUM mg/dL 8.8       Results from last 7 days   Lab Units 11/05/19  0508 11/04/19  1138   WBC 10*3/mm3 6.31 4.81   HEMOGLOBIN g/dL 10.5* 10.2*   HEMATOCRIT % 34.0 31.4*   PLATELETS 10*3/mm3 283 206         Results from last 7 days   Lab Units 11/05/19  0508   CHOLESTEROL mg/dL 88   TRIGLYCERIDES mg/dL 82   HDL CHOL mg/dL 40   LDL CHOL mg/dL 32     Disposition:  Home    Discharge Medications:      Discharge Medications      New Medications      Instructions Start Date   amiodarone 200 MG tablet  Commonly known as:  PACERONE   200 mg, Oral, Every 12 Hours Scheduled         Changes to Medications      Instructions Start Date   losartan 100 MG tablet  Commonly known as:  COZAAR  What changed:    · medication strength  · how much to take  · when to take this   100 mg, Oral, Every 24 Hours Scheduled   Start Date:  11/6/2019     metoprolol tartrate 50 MG tablet  Commonly known as:  LOPRESSOR  What changed:    · medication strength  · how much to take  · when to take this   50 mg, Oral, Every 12 Hours Scheduled         Continue These Medications      Instructions Start  Date   URA BLOOD GLUCOSE METER w/Device kit   1 each, Does not apply      amLODIPine 5 MG tablet  Commonly known as:  NORVASC   5 mg, Oral      atorvastatin 40 MG tablet  Commonly known as:  LIPITOR   TAKE ONE TABLET BY MOUTH EVERY NIGHT AT BEDTIME      cycloSPORINE 0.05 % ophthalmic emulsion  Commonly known as:  RESTASIS   1 drop      ELIQUIS 5 MG tablet tablet  Generic drug:  apixaban   5 mg, Oral, 2 Times Daily      furosemide 40 MG tablet  Commonly known as:  LASIX   As Needed      hydroxychloroquine 200 MG tablet  Commonly known as:  PLAQUENIL   200 mg, Daily, TAKE ONE TABLET BY MOUTH TWICE A DAY      loperamide 2 MG tablet  Commonly known as:  IMODIUM A-D   2 mg, Oral, 4 Times Daily      melatonin 5 MG tablet tablet   5 mg, Oral, Nightly      PROTONIX 40 MG EC tablet  Generic drug:  pantoprazole   40 mg, Oral, Daily      QUEtiapine 25 MG tablet  Commonly known as:  SEROquel   12.5 mg, Oral, Nightly      Vortioxetine HBr 10 MG tablet   10 mg, Oral             I have reviewed the medical decision making with Dr. Burnett in detail.     Discharge Home Instructions:     Discharge Follow-up with PCP    Currently Documented PCP:   Fermin Pittman MD   PCP Phone Number:   [phone]   Discharge Follow-up with Specified Provider: Follow-up with Dr. Burnett's nurse practitioner DIANA Pearson, on December 10th at 12:45pm    Discharge Instructions    1.  Take metoprolol 50 mg twice daily rather than 3 times daily.  2.  Increase losartan to 100 mg daily.  3.  Take amiodarone 200 mg twice daily for 1 month until follow-up appointment with Dr. Burnett's nurse practitioner.  At that point dose will be reduced to 200 mg once daily.  4.  Follow-up with PCP within 1 week of discharge regarding low magnesium level.  5.  Follow-up with gastroenterology, referral has been sent for you, regarding chronic diarrhea as I believe this is led to your electrolyte imbalance.  6.  If you experience any chest  "pain, worsening shortness of breath, dizziness, weakness, or pass out please go immediately to the emergency department  7.  Continue apixaban without missing any doses.  8.  Follow-up with DIANA Pearson on December 10 at 12:45 PM.  Stress testing will be set up outpatient at that time.  9.  If you experience any signs or symptoms of bleeding such as dark tarry stool, bleeding gums while brushing teeth, or nosebleeds that are difficult to control or last longer than 1 hour please call Dr. Burnett's office immediately.  10.  Please keep a log of your blood pressure.  If systolic (top number) is on average above 150 please call Dr. Burnett's office.  Check blood pressure approximately 2 hours after medications have been taken in the morning and again in the evening.  11.  Amiodarone requires outpatient monitoring to include chest x-ray, pulmonary function testing, lab work, and annual eye exams.       Signed:  DIANA Nixon  11/5/2019  3:17 PM      Patient personally interviewed and above subjective findings personally confirmed during a face to face contact with patient today  All findings of physical examination confirmed  All pertinent and performed labs, cardiac procedures ,  radiographs of the last 24 hours personally reviewed  Impression and plans discussed/elaborated and implemented jointly as described above     Kiran Burnett MD          EMR Dragon/Transcription:   \"Dictated utilizing Dragon dictation\".   "

## 2019-11-08 ENCOUNTER — TELEPHONE (OUTPATIENT)
Dept: CARDIOLOGY | Facility: CLINIC | Age: 74
End: 2019-11-08

## 2019-11-08 NOTE — TELEPHONE ENCOUNTER
----- Message from Geovanna Ariza sent at 11/8/2019 11:57 AM EST -----  Regarding: SOA   Contact: [phone]  Had a Cardioversion 11/5 and started Amiodarone and she is very short of breath  Edel (Daughter) [phone]

## 2019-11-08 NOTE — TELEPHONE ENCOUNTER
PER LILLIAN, REDUCE AMIODARONE 200 MG TO ONCE DAILY.  CON (DAUGHTER) IS AWARE AND VERBALIZED UNDERSTANDING.

## 2019-12-10 ENCOUNTER — OFFICE VISIT (OUTPATIENT)
Dept: CARDIOLOGY | Facility: CLINIC | Age: 74
End: 2019-12-10

## 2019-12-10 VITALS
DIASTOLIC BLOOD PRESSURE: 59 MMHG | HEIGHT: 64 IN | WEIGHT: 132 LBS | BODY MASS INDEX: 22.53 KG/M2 | SYSTOLIC BLOOD PRESSURE: 150 MMHG | HEART RATE: 58 BPM

## 2019-12-10 DIAGNOSIS — T50.905A BRADYCARDIA, DRUG INDUCED: ICD-10-CM

## 2019-12-10 DIAGNOSIS — I10 ESSENTIAL HYPERTENSION: ICD-10-CM

## 2019-12-10 DIAGNOSIS — E78.2 MIXED HYPERLIPIDEMIA: ICD-10-CM

## 2019-12-10 DIAGNOSIS — I48.0 PAROXYSMAL ATRIAL FIBRILLATION (HCC): Primary | ICD-10-CM

## 2019-12-10 DIAGNOSIS — R00.1 BRADYCARDIA, DRUG INDUCED: ICD-10-CM

## 2019-12-10 PROCEDURE — 93000 ELECTROCARDIOGRAM COMPLETE: CPT | Performed by: NURSE PRACTITIONER

## 2019-12-10 PROCEDURE — 99214 OFFICE O/P EST MOD 30 MIN: CPT | Performed by: NURSE PRACTITIONER

## 2019-12-10 RX ORDER — QUETIAPINE FUMARATE 50 MG/1
50 TABLET, FILM COATED ORAL NIGHTLY
COMMUNITY
Start: 2019-11-24

## 2019-12-10 RX ORDER — AMLODIPINE BESYLATE 10 MG/1
TABLET ORAL
Qty: 30 TABLET | Refills: 3 | Status: ON HOLD | OUTPATIENT
Start: 2019-12-10 | End: 2020-01-13

## 2019-12-10 NOTE — PROGRESS NOTES
Patient Name: Radha Alfred  :1945  Age: 74 y.o.  Primary Cardiologist: Kiran Burnett MD  Encounter Provider:  DIANA Nixon      Chief Complaint:   Chief Complaint   Patient presents with   • Atrial Fibrillation         HPI this 74-year-old female, known to this provider, comes today regarding recent external cardioversion and addition of amiodarone therapy.  Current diagnoses to include cerebral infarction, DM 2, GERD, hyperlipidemia, hypertension, mitral valve prolapse, PVD, and prior CVA.  Echo 2019 reveals mild MR/TR, moderate dilation of LAC, EF 64% with no evidence of pericardial effusion.  Stress testing done at that time revealed no evidence of myocardial ischemia.  Lipid panel November 15, 2019 reveals triglycerides 101  HDL 54 and LDL 30, AST/ALT within normal limits at that time.  She is anticoagulated on apixaban and on beta-blockade, which was increased during admission.  ECG in office today reveals sinus rhythm, rate 55, QTc normal at 410, no ventricular ectopy noted at this time.  She states she is feeling very fatigued with her lower heart rate at home on the increased dose of metoprolol.      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)   • Type 2 diabetes mellitus (CMS/HCC)   • Encounter for screening for diabetes mellitus   • Hypertension   • Disorder of foot   • History of cardiac catheterization   • History of surgical procedure   • Displacement of lumbar intervertebral disc without myelopathy   • Heparin-induced thrombocytopenia (CMS/HCC)   • Peripheral vascular disease (CMS/HCC)   • Spinal stenosis   • Urinary tract bacterial infections   • Small bowel obstruction (CMS/HCC)   • History of  cholecystectomy   • Postprocedural state   • Rheumatoid arthritis (CMS/HCC)   • Incisional hernia   • Pain   • Need for vaccination   • Polypharmacy   • Disorder of lipid metabolism   • Memory loss   • Mixed hyperlipidemia   • Anticoagulated   • Paroxysmal atrial fibrillation (CMS/HCC)   • Atrial fibrillation (CMS/HCC)   • Bradycardia, drug induced           The following portions of the patient's history were reviewed and updated as appropriate: allergies, current medications, past family history, past medical history, past social history, past surgical history and problem list.    Current Outpatient Medications on File Prior to Visit   Medication Sig Dispense Refill   • amiodarone (PACERONE) 200 MG tablet Take 1 tablet by mouth Every 12 (Twelve) Hours. 60 tablet 1   • atorvastatin (LIPITOR) 40 MG tablet TAKE ONE TABLET BY MOUTH EVERY NIGHT AT BEDTIME     • ELIQUIS 5 MG tablet tablet Take 5 mg by mouth 2 (Two) Times a Day.     • furosemide (LASIX) 40 MG tablet As Needed.     • hydroxychloroquine (PLAQUENIL) 200 MG tablet 200 mg Daily. TAKE ONE TABLET BY MOUTH TWICE A DAY     • loperamide (IMODIUM A-D) 2 MG tablet Take 2 mg by mouth 4 (four) times a day.     • losartan (COZAAR) 100 MG tablet Take 1 tablet by mouth Daily. 30 tablet 11   • melatonin 5 MG tablet tablet Take 5 mg by mouth Every Night.     • QUEtiapine (SEROquel) 50 MG tablet      • [DISCONTINUED] amLODIPine (NORVASC) 5 MG tablet Take 5 mg by mouth.     • [DISCONTINUED] metoprolol tartrate (LOPRESSOR) 50 MG tablet Take 1 tablet by mouth Every 12 (Twelve) Hours. 60 tablet 11   • Blood Glucose Monitoring Suppl (ACURA BLOOD GLUCOSE METER) W/DEVICE kit 1 each.     • cycloSPORINE (RESTASIS) 0.05 % ophthalmic emulsion 1 drop.     • pantoprazole (PROTONIX) 40 MG EC tablet Take 40 mg by mouth Daily.     • QUEtiapine (SEROquel) 25 MG tablet Take 12.5 mg by mouth Every Night.     • Vortioxetine HBr 10 MG tablet Take 10 mg by mouth.       No current  facility-administered medications on file prior to visit.        Past Medical History:   Diagnosis Date   • Anxiety    • Arthritis    • Atrial fibrillation (CMS/HCC)    • Cerebral infarction (CMS/HCC)    • Chest pain    • Diabetes mellitus (CMS/HCC)     TYPE II   • Edema of lower extremity    • Elevated cholesterol    • GERD (gastroesophageal reflux disease)    • Hyperlipidemia    • Hypertension    • IBS (irritable bowel syndrome)    • MVP (mitral valve prolapse)    • PVD (peripheral vascular disease) (CMS/HCC)    • Stroke (CMS/HCC)     has had 2, 22 yr ago and 06/16   • Venous thromboembolism        Past Surgical History:   Procedure Laterality Date   • CARDIAC CATHETERIZATION     • CHOLECYSTECTOMY     • CHOLECYSTECTOMY     • COLONOSCOPY     • CORONARY ARTERY BYPASS GRAFT  1994   • HEMORRHOIDECTOMY     • HERNIA REPAIR     • HYSTERECTOMY     • TOE AMPUTATION         Family History   Problem Relation Age of Onset   • Diabetes Mother    • Heart disease Mother    • Hypertension Mother    • Hyperlipidemia Mother    • COPD Mother    • Heart disease Father    • Hyperlipidemia Father    • Hypertension Father    • Stroke Father    • Diabetes Father        Social History     Socioeconomic History   • Marital status:      Spouse name: Not on file   • Number of children: Not on file   • Years of education: Not on file   • Highest education level: Not on file   Tobacco Use   • Smoking status: Former Smoker   • Smokeless tobacco: Never Used   Substance and Sexual Activity   • Alcohol use: No   • Drug use: No       Review of Systems   Constitution: Positive for malaise/fatigue. Negative for diaphoresis.   HENT: Negative for nosebleeds and stridor.    Cardiovascular: Negative for chest pain, claudication, dyspnea on exertion, irregular heartbeat, leg swelling, near-syncope, orthopnea, palpitations, paroxysmal nocturnal dyspnea and syncope.   Respiratory: Positive for shortness of breath. Negative for cough, hemoptysis and  "wheezing.    Gastrointestinal: Negative for abdominal pain, constipation, diarrhea, hematemesis, hematochezia, melena, nausea and vomiting.   Neurological: Negative for dizziness, focal weakness, light-headedness and weakness.       OBJECTIVE:   Vital Signs  Vitals:    12/10/19 1250   BP: 150/59   Pulse: 58     Estimated body mass index is 22.66 kg/m² as calculated from the following:    Height as of this encounter: 162.6 cm (64\").    Weight as of this encounter: 59.9 kg (132 lb).    Physical Exam   Constitutional: She is oriented to person, place, and time. She appears well-developed and well-nourished. No distress.   HENT:   Head: Normocephalic and atraumatic.   Eyes: Pupils are equal, round, and reactive to light. Conjunctivae and EOM are normal.   Neck: Normal range of motion. Neck supple. No JVD present. No tracheal deviation present. No thyromegaly present.   Cardiovascular: Normal heart sounds and intact distal pulses. Exam reveals no gallop and no friction rub.   No murmur heard.  Pulmonary/Chest: Effort normal and breath sounds normal. No respiratory distress. She has no wheezes. She has no rales. She exhibits no tenderness.   Abdominal: Soft. Bowel sounds are normal. She exhibits no distension. There is no tenderness.   Musculoskeletal: Normal range of motion. She exhibits edema. She exhibits no tenderness or deformity.   BLE 2+ pitting edema   Neurological: She is alert and oriented to person, place, and time.   Skin: Skin is warm and dry. No rash noted. She is not diaphoretic. No erythema. No pallor.   Psychiatric: She has a normal mood and affect. Her behavior is normal.         ECG 12 Lead  Date/Time: 12/10/2019 1:11 PM  Performed by: Shreya Reyes APRN  Authorized by: Shreya Reyes APRN   Comparison: compared with previous ECG from 11/5/2019  Similar to previous ECG  Rhythm: sinus rhythm  Rate: bradycardic    Clinical impression: abnormal EKG            Stress " [id number]  Interpretation Summary        · Findings consistent with an equivocal ECG stress test.  · Left ventricular ejection fraction is hyperdynamic (Calculated EF > 70%).  · Myocardial perfusion imaging indicates a normal myocardial perfusion study with no evidence of ischemia.  · Impressions are consistent with a low risk study.     Asymptomatic for chest pain. Specificity of study reduced secondary to baseline Non-specific ST-T wave abnormalities. Significant motion artifact due to unsteady gait, however ECG is not suggestive of ischemia  Ectopy: none.  Blood pressure response:  Hypertensive baseline and throughout; Baseline 155/65,Peak, 178/60 and Recovery 160//60  Pharmacologic study due to inability to tolerate increasing speed and grade of treadmill due to mobility  Issues and Beta-blocker therapy.  Participated in Low Level exercise and tolerance is poor due to very choppy gait.         Cardiac Echo:  19  Interpretation Summary     · Mild mitral valve regurgitation is present  · Left atrial cavity size is moderately dilated.  · Mild tricuspid valve regurgitation is present.  · Calculated EF = 64.0%  · There is no evidence of pericardial effusion           ASSESSMENT:      Diagnosis Plan   1. Paroxysmal atrial fibrillation (CMS/HCC)  ECG 12 Lead   2. Essential hypertension     3. Bradycardia, drug induced     4. Mixed hyperlipidemia           PLAN OF CARE:     1.  Hypertension- blood pressure in office today is currently elevated.  Will increase amlodipine, and decrease metoprolol given her fatigue.  Could consider addition of hydralazine if she remains hypertensive with these changes.    Importance of controlling hypertension and blood pressure  checkup on the regular basis has been explained  Hypertension as a silent killer has been discussed  Risk reduction of the weight and regular exercises to control the hypertension has been explained      2.  Atrial fibrillation- now sinus rhythm  status post ECV.  Doing well on amiodarone, although her heart rate is 55 and she does feel very fatigued.  Could consider reduction of dose (she is currently on 200 mg daily), if reduction of beta-blockade does not resolve her fatigue and bradycardia.  Continue current medication regimen to include apixaban, beta-blockade, and amiodarone.  Will decrease metoprolol to 25 mg twice daily.    In the setting of atrial fibrillation, I have discussed with the patient/family/POA the risks and benefits of anticoagulation therapy which include increased risk of bleeding and decreased risk of systemic embolization such as stroke. Patient/family/POA verbalize understanding and agree with treatment plan.        3.  Hyperlipidemia- currently controlled well on statin therapy.  Most recent lipid panel as above.  Continue to monitor annually    Risk of the hyperlipidemia, importance of the treatment has been explained  Pros and cons of the statins has been explained  Regular blood workup as well as side effects including the liver failure, myelopathy death has been explained        Decrease metoprolol to 25 mg twice daily, continue amiodarone at 200 mg once daily, continue apixaban, follow-up in 1 month or sooner if needed with repeat ECG      Sincerely,   DIANA Nixon  [facility]  12/10/19  1:48 PM

## 2020-01-03 ENCOUNTER — TELEPHONE (OUTPATIENT)
Dept: CARDIOLOGY | Facility: CLINIC | Age: 75
End: 2020-01-03

## 2020-01-03 NOTE — TELEPHONE ENCOUNTER
STILL HAVING SOA, WOULD LIKE TO KNOW IF AMIODARONE CAN BE DECREASED AGAIN.    SPOKE WITH LILLIAN, CONTINUE MEDS AS YOU HAVE BEEN. KEEP APPT ON 1/10/2020.  IF SOA WORSENS PLEASE GO TO THE ER TO BE CHECKED OUT.  PT VERBALIZED UNDERSTANDING.

## 2020-01-07 ENCOUNTER — OFFICE VISIT (OUTPATIENT)
Dept: CARDIOLOGY | Facility: CLINIC | Age: 75
End: 2020-01-07

## 2020-01-07 VITALS
SYSTOLIC BLOOD PRESSURE: 130 MMHG | HEART RATE: 59 BPM | BODY MASS INDEX: 23.22 KG/M2 | HEIGHT: 64 IN | DIASTOLIC BLOOD PRESSURE: 57 MMHG | WEIGHT: 136 LBS

## 2020-01-07 DIAGNOSIS — I10 ESSENTIAL HYPERTENSION: ICD-10-CM

## 2020-01-07 DIAGNOSIS — I48.0 PAROXYSMAL ATRIAL FIBRILLATION (HCC): ICD-10-CM

## 2020-01-07 DIAGNOSIS — R06.02 SOB (SHORTNESS OF BREATH): Primary | ICD-10-CM

## 2020-01-07 PROCEDURE — 93000 ELECTROCARDIOGRAM COMPLETE: CPT | Performed by: NURSE PRACTITIONER

## 2020-01-07 PROCEDURE — 99214 OFFICE O/P EST MOD 30 MIN: CPT | Performed by: NURSE PRACTITIONER

## 2020-01-07 RX ORDER — ATENOLOL 25 MG/1
25 TABLET ORAL DAILY
Qty: 30 TABLET | Refills: 11 | Status: SHIPPED | OUTPATIENT
Start: 2020-01-07 | End: 2021-01-08 | Stop reason: SDUPTHER

## 2020-01-07 RX ORDER — AMIODARONE HYDROCHLORIDE 200 MG/1
100 TABLET ORAL DAILY
COMMUNITY
End: 2020-01-07

## 2020-01-07 NOTE — PROGRESS NOTES
Patient Name: Radha Alfred  :1945  Age: 74 y.o.  Primary Cardiologist: Kiran Burnett MD  Encounter Provider:  DIANA Nixon      Chief Complaint:   Chief Complaint   Patient presents with   • Shortness of Breath   • Atrial Fibrillation         HPI this 74-year-old female, known to this provider, comes today after recent external cardioversion and addition of amiodarone therapy, with recent adjustment to medication doses.  Current diagnoses to include cerebral infarction, DM 2, GERD, hyperlipidemia, hypertension, mitral valve prolapse, PVD, and prior CVA.  Echo 2019 reveals mild MR/TR, moderate dilation of LAC, EF 64% with no evidence of pericardial effusion.  Stress testing done at that time revealed no evidence of myocardial ischemia.  Lipid panel November 15, 2019 reveals triglycerides 101  HDL 54 and LDL 30, AST/ALT within normal limits at that time.  She is anticoagulated on apixaban and on beta-blockade, which was increased during admission.  ECG in office today reveals sinus rhythm, rate 55, QTc normal at 410, no ventricular ectopy noted at this time.  She continues to be very fatigued despite reduction of dose of amiodarone and beta-blockade.      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)   • Type 2 diabetes mellitus (CMS/HCC)   • Encounter for screening for diabetes mellitus   • Hypertension   • Disorder of foot   • History of cardiac catheterization   • History of surgical procedure   • Displacement of lumbar intervertebral disc without myelopathy   • Heparin-induced thrombocytopenia (CMS/HCC)   • Peripheral vascular disease (CMS/HCC)   • Spinal stenosis   • Urinary tract bacterial infections   • Small bowel  obstruction (CMS/HCC)   • History of cholecystectomy   • Postprocedural state   • Rheumatoid arthritis (CMS/HCC)   • Incisional hernia   • Pain   • Need for vaccination   • Polypharmacy   • Disorder of lipid metabolism   • Memory loss   • Mixed hyperlipidemia   • Anticoagulated   • Paroxysmal atrial fibrillation (CMS/HCC)   • Atrial fibrillation (CMS/HCC)   • Bradycardia, drug induced           The following portions of the patient's history were reviewed and updated as appropriate: allergies, current medications, past family history, past medical history, past social history, past surgical history and problem list.    Current Outpatient Medications on File Prior to Visit   Medication Sig Dispense Refill   • amiodarone (PACERONE) 200 MG tablet Take 1 tablet by mouth Every 12 (Twelve) Hours. 60 tablet 1   • amLODIPine (NORVASC) 10 MG tablet 1 TAB DAILY 30 tablet 3   • atorvastatin (LIPITOR) 40 MG tablet TAKE ONE TABLET BY MOUTH EVERY NIGHT AT BEDTIME     • Blood Glucose Monitoring Suppl (ACURA BLOOD GLUCOSE METER) W/DEVICE kit 1 each.     • cycloSPORINE (RESTASIS) 0.05 % ophthalmic emulsion 1 drop.     • ELIQUIS 5 MG tablet tablet Take 5 mg by mouth 2 (Two) Times a Day.     • furosemide (LASIX) 40 MG tablet As Needed.     • hydroxychloroquine (PLAQUENIL) 200 MG tablet 200 mg Daily. TAKE ONE TABLET BY MOUTH TWICE A DAY     • loperamide (IMODIUM A-D) 2 MG tablet Take 2 mg by mouth 4 (four) times a day.     • losartan (COZAAR) 100 MG tablet Take 1 tablet by mouth Daily. 30 tablet 11   • melatonin 5 MG tablet tablet Take 5 mg by mouth Every Night.     • pantoprazole (PROTONIX) 40 MG EC tablet Take 40 mg by mouth Daily.     • QUEtiapine (SEROquel) 25 MG tablet Take 12.5 mg by mouth Every Night.     • QUEtiapine (SEROquel) 50 MG tablet      • Vortioxetine HBr 10 MG tablet Take 10 mg by mouth.       No current facility-administered medications on file prior to visit.        Past Medical History:   Diagnosis Date   • Anxiety     • Arthritis    • Atrial fibrillation (CMS/HCC)    • Cerebral infarction (CMS/HCC)    • Chest pain    • Diabetes mellitus (CMS/HCC)     TYPE II   • Edema of lower extremity    • Elevated cholesterol    • GERD (gastroesophageal reflux disease)    • Hyperlipidemia    • Hypertension    • IBS (irritable bowel syndrome)    • MVP (mitral valve prolapse)    • PVD (peripheral vascular disease) (CMS/HCC)    • Stroke (CMS/HCC)     has had 2, 22 yr ago and 06/16   • Venous thromboembolism        Past Surgical History:   Procedure Laterality Date   • CARDIAC CATHETERIZATION     • CHOLECYSTECTOMY     • CHOLECYSTECTOMY     • COLONOSCOPY     • CORONARY ARTERY BYPASS GRAFT  1994   • HEMORRHOIDECTOMY     • HERNIA REPAIR     • HYSTERECTOMY     • TOE AMPUTATION         Family History   Problem Relation Age of Onset   • Diabetes Mother    • Heart disease Mother    • Hypertension Mother    • Hyperlipidemia Mother    • COPD Mother    • Heart disease Father    • Hyperlipidemia Father    • Hypertension Father    • Stroke Father    • Diabetes Father        Social History     Socioeconomic History   • Marital status:      Spouse name: Not on file   • Number of children: Not on file   • Years of education: Not on file   • Highest education level: Not on file   Tobacco Use   • Smoking status: Former Smoker   • Smokeless tobacco: Never Used   Substance and Sexual Activity   • Alcohol use: No   • Drug use: No       Review of Systems   Constitution: Positive for malaise/fatigue. Negative for diaphoresis.   HENT: Negative for nosebleeds and stridor.    Cardiovascular: Positive for dyspnea on exertion. Negative for chest pain, claudication, irregular heartbeat, leg swelling, near-syncope, orthopnea, palpitations, paroxysmal nocturnal dyspnea and syncope.   Respiratory: Positive for shortness of breath. Negative for cough, hemoptysis and wheezing.    Gastrointestinal: Negative for abdominal pain, constipation, diarrhea, hematemesis,  "hematochezia, melena, nausea and vomiting.   Neurological: Negative for dizziness, focal weakness, light-headedness and weakness.       OBJECTIVE:   Vital Signs  Vitals:    20 1436   BP: 130/57   Pulse: 59     Estimated body mass index is 23.34 kg/m² as calculated from the following:    Height as of this encounter: 162.6 cm (64\").    Weight as of this encounter: 61.7 kg (136 lb).    Physical Exam   Constitutional: She is oriented to person, place, and time. She appears well-developed and well-nourished. No distress.   HENT:   Head: Normocephalic and atraumatic.   Eyes: Pupils are equal, round, and reactive to light. Conjunctivae and EOM are normal.   Neck: Normal range of motion. Neck supple. No JVD present. No tracheal deviation present. No thyromegaly present.   Cardiovascular: Normal heart sounds and intact distal pulses. Exam reveals no gallop and no friction rub.   No murmur heard.  Pulmonary/Chest: Effort normal and breath sounds normal. No respiratory distress. She has no wheezes. She has no rales. She exhibits no tenderness.   Abdominal: Soft. Bowel sounds are normal. She exhibits no distension. There is no tenderness.   Musculoskeletal: Normal range of motion. She exhibits edema. She exhibits no tenderness or deformity.   BLE 2+ pitting edema LLE-normal per baseline   Neurological: She is alert and oriented to person, place, and time.   Skin: Skin is warm and dry. No rash noted. She is not diaphoretic. No erythema. No pallor.   Psychiatric: She has a normal mood and affect. Her behavior is normal.         ECG 12 Lead  Date/Time: 2020 3:22 PM  Performed by: Shreya Reyes APRN  Authorized by: Shreya Reyes APRN   Comparison: compared with previous ECG from 12/10/2019  Similar to previous ECG  Rhythm: sinus rhythm  Rate: bradycardic  Other findings: non-specific ST-T wave changes    Clinical impression: abnormal EKG            Stress Testin18  Interpretation Summary "        · Findings consistent with an equivocal ECG stress test.  · Left ventricular ejection fraction is hyperdynamic (Calculated EF > 70%).  · Myocardial perfusion imaging indicates a normal myocardial perfusion study with no evidence of ischemia.  · Impressions are consistent with a low risk study.     Asymptomatic for chest pain. Specificity of study reduced secondary to baseline Non-specific ST-T wave abnormalities. Significant motion artifact due to unsteady gait, however ECG is not suggestive of ischemia  Ectopy: none.  Blood pressure response:  Hypertensive baseline and throughout; Baseline 155/65,Peak, 178/60 and Recovery 160//60  Pharmacologic study due to inability to tolerate increasing speed and grade of treadmill due to mobility  Issues and Beta-blocker therapy.  Participated in Low Level exercise and tolerance is poor due to very choppy gait.         Cardiac Echo:  11/4/19  Interpretation Summary     · Mild mitral valve regurgitation is present  · Left atrial cavity size is moderately dilated.  · Mild tricuspid valve regurgitation is present.  · Calculated EF = 64.0%  · There is no evidence of pericardial effusion           ASSESSMENT:      Diagnosis Plan   1. SOB (shortness of breath)  Stress Test With Myocardial Perfusion One Day   2. Paroxysmal atrial fibrillation (CMS/HCC)  ECG 12 Lead    Stress Test With Myocardial Perfusion One Day   3. Essential hypertension           PLAN OF CARE:     1.  Hypertension- blood pressure in office today is currently stable and controlled.  However, the patient remains very fatigued on amiodarone and metoprolol.  We will stop amiodarone as she is also short of breath, change metoprolol to atenolol, and have the patient follow-up in 1 month or sooner if needed.    Importance of controlling hypertension and blood pressure  checkup on the regular basis has been explained  Hypertension as a silent killer has been discussed  Risk reduction of the weight and regular  exercises to control the hypertension has been explained        2.  Atrial fibrillation- remains sinus rhythm on ECG today in office.  She is very short of breath on amiodarone, and complains of significant fatigue with metoprolol.  Medication changes as above, check stress test, follow-up in 4 weeks or sooner if needed.  Continue apixaban.    In the setting of atrial fibrillation, I have discussed with the patient/family/POA the risks and benefits of anticoagulation therapy which include increased risk of bleeding and decreased risk of systemic embolization such as stroke. Patient/family/POA verbalize understanding and agree with treatment plan.      3.  Hyperlipidemia- remains controlled on statin therapy.  Most recent lipid panel as previously dictated.  Continue to monitor yearly.    Risk of the hyperlipidemia, importance of the treatment has been explained  Pros and cons of the statins has been explained  Regular blood workup as well as side effects including the liver failure, myelopathy death has been explained        4.  Shortness of breath-noted on amiodarone, has been persistent since addition of this medication.  Will discontinue as the patient is currently sinus rhythm and very symptomatic on the medication, change metoprolol to atenolol, and will check stress test.  Last echo as above.  Follow-up in 4 weeks or sooner if needed.    Stop amiodarone, stop metoprolol, start atenolol 25 mg daily, Angie, follow-up in 4 weeks or sooner if needed.    Sincerely,   DIANA Nixon  [facility]  01/10/20  9:39 AM

## 2020-01-11 ENCOUNTER — HOSPITAL ENCOUNTER (INPATIENT)
Facility: HOSPITAL | Age: 75
LOS: 2 days | Discharge: HOME | End: 2020-01-13
Attending: INTERNAL MEDICINE | Admitting: INTERNAL MEDICINE

## 2020-01-11 ENCOUNTER — APPOINTMENT (OUTPATIENT)
Dept: CARDIOLOGY | Facility: HOSPITAL | Age: 75
End: 2020-01-11

## 2020-01-11 PROBLEM — I50.9 NEW ONSET OF CONGESTIVE HEART FAILURE (HCC): Status: ACTIVE | Noted: 2020-01-11

## 2020-01-11 LAB
ALBUMIN SERPL-MCNC: 3.9 G/DL (ref 3.5–5.2)
ALBUMIN/GLOB SERPL: 1.5 G/DL
ALP SERPL-CCNC: 60 U/L (ref 39–117)
ALT SERPL W P-5'-P-CCNC: 11 U/L (ref 1–33)
ANION GAP SERPL CALCULATED.3IONS-SCNC: 15.8 MMOL/L (ref 5–15)
AORTIC DIMENSIONLESS INDEX: 0.7 (DI)
AST SERPL-CCNC: 18 U/L (ref 1–32)
BASOPHILS # BLD AUTO: 0.05 10*3/MM3 (ref 0–0.2)
BASOPHILS NFR BLD AUTO: 0.9 % (ref 0–1.5)
BH CV ECHO MEAS - ACS: 1.6 CM
BH CV ECHO MEAS - AO MAX PG (FULL): 8.1 MMHG
BH CV ECHO MEAS - AO MAX PG: 13.1 MMHG
BH CV ECHO MEAS - AO MEAN PG (FULL): 4 MMHG
BH CV ECHO MEAS - AO MEAN PG: 7 MMHG
BH CV ECHO MEAS - AO ROOT AREA (BSA CORRECTED): 1.6
BH CV ECHO MEAS - AO ROOT AREA: 5.3 CM^2
BH CV ECHO MEAS - AO ROOT DIAM: 2.6 CM
BH CV ECHO MEAS - AO V2 MAX: 181 CM/SEC
BH CV ECHO MEAS - AO V2 MEAN: 121 CM/SEC
BH CV ECHO MEAS - AO V2 VTI: 41.6 CM
BH CV ECHO MEAS - AVA(I,A): 1.9 CM^2
BH CV ECHO MEAS - AVA(I,D): 1.9 CM^2
BH CV ECHO MEAS - AVA(V,A): 1.8 CM^2
BH CV ECHO MEAS - AVA(V,D): 1.8 CM^2
BH CV ECHO MEAS - BSA(HAYCOCK): 1.7 M^2
BH CV ECHO MEAS - BSA: 1.7 M^2
BH CV ECHO MEAS - BZI_BMI: 23 KILOGRAMS/M^2
BH CV ECHO MEAS - BZI_METRIC_HEIGHT: 163 CM
BH CV ECHO MEAS - BZI_METRIC_WEIGHT: 61 KG
BH CV ECHO MEAS - EDV(CUBED): 85.2 ML
BH CV ECHO MEAS - EDV(MOD-SP2): 37 ML
BH CV ECHO MEAS - EDV(MOD-SP4): 19 ML
BH CV ECHO MEAS - EDV(TEICH): 87.7 ML
BH CV ECHO MEAS - EF(CUBED): 81.7 %
BH CV ECHO MEAS - EF(MOD-BP): 68 %
BH CV ECHO MEAS - EF(MOD-SP2): 67.6 %
BH CV ECHO MEAS - EF(MOD-SP4): 68.4 %
BH CV ECHO MEAS - EF(TEICH): 74.5 %
BH CV ECHO MEAS - ESV(CUBED): 15.6 ML
BH CV ECHO MEAS - ESV(MOD-SP2): 12 ML
BH CV ECHO MEAS - ESV(MOD-SP4): 6 ML
BH CV ECHO MEAS - ESV(TEICH): 22.3 ML
BH CV ECHO MEAS - FS: 43.2 %
BH CV ECHO MEAS - IVS/LVPW: 1.1
BH CV ECHO MEAS - IVSD: 1.2 CM
BH CV ECHO MEAS - LAT PEAK E' VEL: 6.9 CM/SEC
BH CV ECHO MEAS - LV DIASTOLIC VOL/BSA (35-75): 11.5 ML/M^2
BH CV ECHO MEAS - LV MASS(C)D: 180 GRAMS
BH CV ECHO MEAS - LV MASS(C)DI: 108.7 GRAMS/M^2
BH CV ECHO MEAS - LV MAX PG: 5 MMHG
BH CV ECHO MEAS - LV MEAN PG: 3 MMHG
BH CV ECHO MEAS - LV SYSTOLIC VOL/BSA (12-30): 3.6 ML/M^2
BH CV ECHO MEAS - LV V1 MAX: 112 CM/SEC
BH CV ECHO MEAS - LV V1 MEAN: 75.3 CM/SEC
BH CV ECHO MEAS - LV V1 VTI: 28.4 CM
BH CV ECHO MEAS - LVIDD: 4.4 CM
BH CV ECHO MEAS - LVIDS: 2.5 CM
BH CV ECHO MEAS - LVLD AP2: 5.4 CM
BH CV ECHO MEAS - LVLD AP4: 5.6 CM
BH CV ECHO MEAS - LVLS AP2: 4.2 CM
BH CV ECHO MEAS - LVLS AP4: 4.4 CM
BH CV ECHO MEAS - LVOT AREA (M): 2.8 CM^2
BH CV ECHO MEAS - LVOT AREA: 2.8 CM^2
BH CV ECHO MEAS - LVOT DIAM: 1.9 CM
BH CV ECHO MEAS - LVPWD: 1.1 CM
BH CV ECHO MEAS - MED PEAK E' VEL: 7.6 CM/SEC
BH CV ECHO MEAS - MV A DUR: 135 SEC
BH CV ECHO MEAS - MV A MAX VEL: 45.4 CM/SEC
BH CV ECHO MEAS - MV DEC SLOPE: 924 CM/SEC^2
BH CV ECHO MEAS - MV DEC TIME: 119 SEC
BH CV ECHO MEAS - MV E MAX VEL: 142 CM/SEC
BH CV ECHO MEAS - MV E/A: 3.1
BH CV ECHO MEAS - MV MAX PG: 9.1 MMHG
BH CV ECHO MEAS - MV MEAN PG: 3 MMHG
BH CV ECHO MEAS - MV P1/2T MAX VEL: 163 CM/SEC
BH CV ECHO MEAS - MV P1/2T: 51.7 MSEC
BH CV ECHO MEAS - MV V2 MAX: 151 CM/SEC
BH CV ECHO MEAS - MV V2 MEAN: 78.2 CM/SEC
BH CV ECHO MEAS - MV V2 VTI: 32.8 CM
BH CV ECHO MEAS - MVA P1/2T LCG: 1.3 CM^2
BH CV ECHO MEAS - MVA(P1/2T): 4.3 CM^2
BH CV ECHO MEAS - MVA(VTI): 2.5 CM^2
BH CV ECHO MEAS - PA ACC TIME: 0.09 SEC
BH CV ECHO MEAS - PA MAX PG (FULL): 1.5 MMHG
BH CV ECHO MEAS - PA MAX PG: 4.4 MMHG
BH CV ECHO MEAS - PA PR(ACCEL): 40.8 MMHG
BH CV ECHO MEAS - PA V2 MAX: 105 CM/SEC
BH CV ECHO MEAS - PVA(V,A): 1.8 CM^2
BH CV ECHO MEAS - PVA(V,D): 1.8 CM^2
BH CV ECHO MEAS - QP/QS: 0.62
BH CV ECHO MEAS - RAP SYSTOLE: 3 MMHG
BH CV ECHO MEAS - RV MAX PG: 2.9 MMHG
BH CV ECHO MEAS - RV MEAN PG: 2 MMHG
BH CV ECHO MEAS - RV V1 MAX: 85 CM/SEC
BH CV ECHO MEAS - RV V1 MEAN: 56.7 CM/SEC
BH CV ECHO MEAS - RV V1 VTI: 22.1 CM
BH CV ECHO MEAS - RVOT AREA: 2.3 CM^2
BH CV ECHO MEAS - RVOT DIAM: 1.7 CM
BH CV ECHO MEAS - RVSP: 40 MMHG
BH CV ECHO MEAS - SI(AO): 133.4 ML/M^2
BH CV ECHO MEAS - SI(CUBED): 42 ML/M^2
BH CV ECHO MEAS - SI(LVOT): 48.6 ML/M^2
BH CV ECHO MEAS - SI(MOD-SP2): 15.1 ML/M^2
BH CV ECHO MEAS - SI(MOD-SP4): 7.9 ML/M^2
BH CV ECHO MEAS - SI(TEICH): 39.5 ML/M^2
BH CV ECHO MEAS - SV(AO): 220.9 ML
BH CV ECHO MEAS - SV(CUBED): 69.6 ML
BH CV ECHO MEAS - SV(LVOT): 80.5 ML
BH CV ECHO MEAS - SV(MOD-SP2): 25 ML
BH CV ECHO MEAS - SV(MOD-SP4): 13 ML
BH CV ECHO MEAS - SV(RVOT): 50.2 ML
BH CV ECHO MEAS - SV(TEICH): 65.4 ML
BH CV ECHO MEAS - TAPSE (>1.6): 2.4 CM
BH CV ECHO MEAS - TR MAX PG: 37 MMHG
BH CV ECHO MEAS - TR MAX VEL: 303 CM/SEC
BH CV ECHO MEASUREMENTS AVERAGE E/E' RATIO: 19.59
BH CV XLRA - RV BASE: 2.7 CM
BH CV XLRA - TDI S': 12.8 CM/SEC
BILIRUB SERPL-MCNC: 0.7 MG/DL (ref 0.2–1.2)
BUN BLD-MCNC: 16 MG/DL (ref 8–23)
BUN/CREAT SERPL: 11.1 (ref 7–25)
CALCIUM SPEC-SCNC: 8.5 MG/DL (ref 8.6–10.5)
CHLORIDE SERPL-SCNC: 107 MMOL/L (ref 98–107)
CO2 SERPL-SCNC: 21.2 MMOL/L (ref 22–29)
CREAT BLD-MCNC: 1.44 MG/DL (ref 0.57–1)
DEPRECATED RDW RBC AUTO: 48 FL (ref 37–54)
EOSINOPHIL # BLD AUTO: 0.06 10*3/MM3 (ref 0–0.4)
EOSINOPHIL NFR BLD AUTO: 1.1 % (ref 0.3–6.2)
ERYTHROCYTE [DISTWIDTH] IN BLOOD BY AUTOMATED COUNT: 15.5 % (ref 12.3–15.4)
GFR SERPL CREATININE-BSD FRML MDRD: 35 ML/MIN/1.73
GLOBULIN UR ELPH-MCNC: 2.6 GM/DL
GLUCOSE BLD-MCNC: 94 MG/DL (ref 65–99)
HCT VFR BLD AUTO: 30.4 % (ref 34–46.6)
HGB BLD-MCNC: 9.7 G/DL (ref 12–15.9)
IMM GRANULOCYTES # BLD AUTO: 0.01 10*3/MM3 (ref 0–0.05)
IMM GRANULOCYTES NFR BLD AUTO: 0.2 % (ref 0–0.5)
IRON 24H UR-MRATE: 42 MCG/DL (ref 37–145)
IRON SATN MFR SERPL: 11 % (ref 20–50)
LEFT ATRIUM VOLUME INDEX: 40 ML/M2
LYMPHOCYTES # BLD AUTO: 1.49 10*3/MM3 (ref 0.7–3.1)
LYMPHOCYTES NFR BLD AUTO: 26.4 % (ref 19.6–45.3)
MAXIMAL PREDICTED HEART RATE: 145 BPM
MCH RBC QN AUTO: 27.4 PG (ref 26.6–33)
MCHC RBC AUTO-ENTMCNC: 31.9 G/DL (ref 31.5–35.7)
MCV RBC AUTO: 85.9 FL (ref 79–97)
MONOCYTES # BLD AUTO: 0.65 10*3/MM3 (ref 0.1–0.9)
MONOCYTES NFR BLD AUTO: 11.5 % (ref 5–12)
NEUTROPHILS # BLD AUTO: 3.39 10*3/MM3 (ref 1.7–7)
NEUTROPHILS NFR BLD AUTO: 59.9 % (ref 42.7–76)
NRBC BLD AUTO-RTO: 0 /100 WBC (ref 0–0.2)
NT-PROBNP SERPL-MCNC: 4306 PG/ML (ref 5–1800)
PLATELET # BLD AUTO: 230 10*3/MM3 (ref 140–450)
PMV BLD AUTO: 10.4 FL (ref 6–12)
POTASSIUM BLD-SCNC: 3.6 MMOL/L (ref 3.5–5.2)
PROT SERPL-MCNC: 6.5 G/DL (ref 6–8.5)
RBC # BLD AUTO: 3.54 10*6/MM3 (ref 3.77–5.28)
SODIUM BLD-SCNC: 144 MMOL/L (ref 136–145)
STRESS TARGET HR: 123 BPM
TIBC SERPL-MCNC: 386 MCG/DL (ref 298–536)
TRANSFERRIN SERPL-MCNC: 259 MG/DL (ref 200–360)
TSH SERPL DL<=0.05 MIU/L-ACNC: 27 UIU/ML (ref 0.27–4.2)
WBC NRBC COR # BLD: 5.65 10*3/MM3 (ref 3.4–10.8)

## 2020-01-11 PROCEDURE — 84443 ASSAY THYROID STIM HORMONE: CPT | Performed by: INTERNAL MEDICINE

## 2020-01-11 PROCEDURE — 84480 ASSAY TRIIODOTHYRONINE (T3): CPT | Performed by: INTERNAL MEDICINE

## 2020-01-11 PROCEDURE — 99223 1ST HOSP IP/OBS HIGH 75: CPT | Performed by: INTERNAL MEDICINE

## 2020-01-11 PROCEDURE — 84436 ASSAY OF TOTAL THYROXINE: CPT | Performed by: INTERNAL MEDICINE

## 2020-01-11 PROCEDURE — 93306 TTE W/DOPPLER COMPLETE: CPT

## 2020-01-11 PROCEDURE — 83880 ASSAY OF NATRIURETIC PEPTIDE: CPT | Performed by: INTERNAL MEDICINE

## 2020-01-11 PROCEDURE — 85025 COMPLETE CBC W/AUTO DIFF WBC: CPT | Performed by: INTERNAL MEDICINE

## 2020-01-11 PROCEDURE — 93005 ELECTROCARDIOGRAM TRACING: CPT | Performed by: INTERNAL MEDICINE

## 2020-01-11 PROCEDURE — 84479 ASSAY OF THYROID (T3 OR T4): CPT | Performed by: INTERNAL MEDICINE

## 2020-01-11 PROCEDURE — 80053 COMPREHEN METABOLIC PANEL: CPT | Performed by: INTERNAL MEDICINE

## 2020-01-11 PROCEDURE — 93010 ELECTROCARDIOGRAM REPORT: CPT | Performed by: INTERNAL MEDICINE

## 2020-01-11 PROCEDURE — 84466 ASSAY OF TRANSFERRIN: CPT | Performed by: INTERNAL MEDICINE

## 2020-01-11 PROCEDURE — 93306 TTE W/DOPPLER COMPLETE: CPT | Performed by: INTERNAL MEDICINE

## 2020-01-11 PROCEDURE — 83540 ASSAY OF IRON: CPT | Performed by: INTERNAL MEDICINE

## 2020-01-11 RX ORDER — CHOLECALCIFEROL (VITAMIN D3) 125 MCG
5 CAPSULE ORAL NIGHTLY
Status: DISCONTINUED | OUTPATIENT
Start: 2020-01-11 | End: 2020-01-13 | Stop reason: HOSPADM

## 2020-01-11 RX ORDER — HYDROXYCHLOROQUINE SULFATE 200 MG/1
200 TABLET, FILM COATED ORAL DAILY
Status: DISCONTINUED | OUTPATIENT
Start: 2020-01-11 | End: 2020-01-13 | Stop reason: HOSPADM

## 2020-01-11 RX ORDER — ATENOLOL 25 MG/1
25 TABLET ORAL DAILY
Status: DISCONTINUED | OUTPATIENT
Start: 2020-01-11 | End: 2020-01-13 | Stop reason: HOSPADM

## 2020-01-11 RX ORDER — AMLODIPINE BESYLATE 10 MG/1
10 TABLET ORAL
Status: DISCONTINUED | OUTPATIENT
Start: 2020-01-11 | End: 2020-01-13 | Stop reason: HOSPADM

## 2020-01-11 RX ORDER — NITROGLYCERIN 0.4 MG/1
0.4 TABLET SUBLINGUAL
Status: DISCONTINUED | OUTPATIENT
Start: 2020-01-11 | End: 2020-01-13 | Stop reason: HOSPADM

## 2020-01-11 RX ORDER — LOSARTAN POTASSIUM 50 MG/1
100 TABLET ORAL
Status: DISCONTINUED | OUTPATIENT
Start: 2020-01-11 | End: 2020-01-13 | Stop reason: HOSPADM

## 2020-01-11 RX ORDER — SODIUM CHLORIDE 0.9 % (FLUSH) 0.9 %
10 SYRINGE (ML) INJECTION EVERY 12 HOURS SCHEDULED
Status: DISCONTINUED | OUTPATIENT
Start: 2020-01-11 | End: 2020-01-13 | Stop reason: HOSPADM

## 2020-01-11 RX ORDER — ATORVASTATIN CALCIUM 20 MG/1
40 TABLET, FILM COATED ORAL NIGHTLY
Status: DISCONTINUED | OUTPATIENT
Start: 2020-01-11 | End: 2020-01-13 | Stop reason: HOSPADM

## 2020-01-11 RX ORDER — SODIUM CHLORIDE 0.9 % (FLUSH) 0.9 %
10 SYRINGE (ML) INJECTION AS NEEDED
Status: DISCONTINUED | OUTPATIENT
Start: 2020-01-11 | End: 2020-01-13 | Stop reason: HOSPADM

## 2020-01-11 RX ORDER — QUETIAPINE FUMARATE 50 MG/1
50 TABLET, FILM COATED ORAL NIGHTLY
Status: DISCONTINUED | OUTPATIENT
Start: 2020-01-11 | End: 2020-01-13 | Stop reason: HOSPADM

## 2020-01-11 RX ORDER — ASPIRIN 81 MG/1
81 TABLET ORAL DAILY
Status: DISCONTINUED | OUTPATIENT
Start: 2020-01-11 | End: 2020-01-13 | Stop reason: HOSPADM

## 2020-01-11 RX ORDER — ACETAMINOPHEN 650 MG/1
650 SUPPOSITORY RECTAL EVERY 4 HOURS PRN
Status: DISCONTINUED | OUTPATIENT
Start: 2020-01-11 | End: 2020-01-13 | Stop reason: HOSPADM

## 2020-01-11 RX ORDER — ASPIRIN 81 MG/1
81 TABLET ORAL DAILY
COMMUNITY
End: 2020-03-12

## 2020-01-11 RX ORDER — DIAZEPAM 2 MG/1
2 TABLET ORAL EVERY 6 HOURS PRN
Status: DISCONTINUED | OUTPATIENT
Start: 2020-01-11 | End: 2020-01-13 | Stop reason: HOSPADM

## 2020-01-11 RX ORDER — ACETAMINOPHEN 160 MG/5ML
650 SOLUTION ORAL EVERY 4 HOURS PRN
Status: DISCONTINUED | OUTPATIENT
Start: 2020-01-11 | End: 2020-01-13 | Stop reason: HOSPADM

## 2020-01-11 RX ORDER — PANTOPRAZOLE SODIUM 40 MG/1
40 TABLET, DELAYED RELEASE ORAL DAILY
Status: DISCONTINUED | OUTPATIENT
Start: 2020-01-11 | End: 2020-01-13 | Stop reason: HOSPADM

## 2020-01-11 RX ORDER — ACETAMINOPHEN 325 MG/1
650 TABLET ORAL EVERY 4 HOURS PRN
Status: DISCONTINUED | OUTPATIENT
Start: 2020-01-11 | End: 2020-01-13 | Stop reason: HOSPADM

## 2020-01-11 RX ADMIN — HYDROXYCHLOROQUINE SULFATE 200 MG: 200 TABLET, FILM COATED ORAL at 12:14

## 2020-01-11 RX ADMIN — APIXABAN 5 MG: 5 TABLET, FILM COATED ORAL at 12:13

## 2020-01-11 RX ADMIN — ATENOLOL 25 MG: 25 TABLET ORAL at 12:14

## 2020-01-11 RX ADMIN — SODIUM CHLORIDE, PRESERVATIVE FREE 10 ML: 5 INJECTION INTRAVENOUS at 20:36

## 2020-01-11 RX ADMIN — PANTOPRAZOLE SODIUM 40 MG: 40 TABLET, DELAYED RELEASE ORAL at 12:21

## 2020-01-11 RX ADMIN — DIAZEPAM 2 MG: 2 TABLET ORAL at 15:32

## 2020-01-11 RX ADMIN — QUETIAPINE FUMARATE 50 MG: 50 TABLET, FILM COATED ORAL at 20:36

## 2020-01-11 RX ADMIN — LOSARTAN POTASSIUM 100 MG: 50 TABLET, FILM COATED ORAL at 12:13

## 2020-01-11 RX ADMIN — Medication 5 MG: at 20:36

## 2020-01-11 RX ADMIN — ASPIRIN 81 MG: 81 TABLET, COATED ORAL at 12:13

## 2020-01-11 RX ADMIN — ATORVASTATIN CALCIUM 40 MG: 20 TABLET, FILM COATED ORAL at 20:35

## 2020-01-11 RX ADMIN — DIAZEPAM 2 MG: 2 TABLET ORAL at 22:40

## 2020-01-11 RX ADMIN — SODIUM CHLORIDE, PRESERVATIVE FREE 10 ML: 5 INJECTION INTRAVENOUS at 12:23

## 2020-01-11 RX ADMIN — APIXABAN 5 MG: 5 TABLET, FILM COATED ORAL at 20:36

## 2020-01-11 NOTE — CONSULTS
[facility]  [phone]  Elton Cespedes MD      Subjective   LOS: 0 days     Thank you for this consultation.  74-year-old female who had new diagnosis A. fib in early November.  She underwent cardioversion and was started on amiodarone.  Patient states she has been short of breath since then.  She is presently requiring oxygen.  She was seen in the [facility] ER yesterday and sent over here.  Her chest x-ray there is read as cardiomegaly with mild to moderate pulmonary edema and small left pleural effusion likely CHF.  We will asked the question if this could be amiodarone lung toxicity.  Patient has no previous history of lung problems.  Prior to November she had no shortness of breath or exercise limitation from same.  She has never been on chronic oxygen at home.  She quit smoking 25 years ago.  She used to smoke a pack and a half a day and started smoking age 13.  Despite this no diagnosis of COPD and not on inhalers.  She reports that since her cardioversion she also has had some increased difficulty swallowing food.  She has had a previous stroke 4 years ago but with no residual deficit.  She denies any coronary artery disease as such.  No prior cancer.  She has chronic leg swelling that has been there for many months possibly years.  No clear etiology is described.    Radha Alfred  reports that she does not drink alcohol.,  reports that she has quit smoking. She has never used smokeless tobacco.     Past Hx:  has a past medical history of Anxiety, Arthritis, Atrial fibrillation (CMS/HCC), Cerebral infarction (CMS/HCC), Chest pain, Diabetes mellitus (CMS/HCC), Edema of lower extremity, Elevated cholesterol, GERD (gastroesophageal reflux disease), Hyperlipidemia, Hypertension, IBS (irritable bowel syndrome), MVP (mitral valve prolapse), New onset of congestive heart failure (CMS/HCC) (1/11/2020), PVD (peripheral vascular disease) (CMS/HCC), Stroke (CMS/HCC), and Venous thromboembolism.  Surg Hx:   has a past surgical history that includes Hysterectomy; Toe amputation; Cardiac catheterization; Colonoscopy; Hernia repair; Hemorrhoid surgery; Cholecystectomy; Cholecystectomy; and Aorta - femoral artery bypass graft (1995).  FH: family history includes COPD in her mother; Diabetes in her father and mother; Heart disease in her father and mother; Hyperlipidemia in her father and mother; Hypertension in her father and mother; Stroke in her father.  SH:  reports that she has quit smoking. She has never used smokeless tobacco. She reports that she does not drink alcohol or use drugs.    Medications Prior to Admission   Medication Sig Dispense Refill Last Dose   • amLODIPine (NORVASC) 10 MG tablet 1 TAB DAILY 30 tablet 3 1/10/2020 at Unknown time   • aspirin 81 MG EC tablet Take 81 mg by mouth Daily.      • atenolol (TENORMIN) 25 MG tablet Take 1 tablet by mouth Daily. 30 tablet 11 1/10/2020 at Unknown time   • atorvastatin (LIPITOR) 40 MG tablet TAKE ONE TABLET BY MOUTH EVERY NIGHT AT BEDTIME   1/10/2020 at Unknown time   • ELIQUIS 5 MG tablet tablet Take 5 mg by mouth 2 (Two) Times a Day.   1/10/2020 at Unknown time   • hydroxychloroquine (PLAQUENIL) 200 MG tablet Take 200 mg by mouth Daily. TAKE ONE TABLET BY MOUTH TWICE A DAY   1/10/2020 at Unknown time   • loperamide (IMODIUM A-D) 2 MG tablet Take 2 mg by mouth 4 (four) times a day.   1/10/2020 at Unknown time   • losartan (COZAAR) 100 MG tablet Take 1 tablet by mouth Daily. 30 tablet 11 1/10/2020 at Unknown time   • melatonin 5 MG tablet tablet Take 5 mg by mouth Every Night.   Past Week at Unknown time   • pantoprazole (PROTONIX) 40 MG EC tablet Take 40 mg by mouth Daily.   1/10/2020 at Unknown time   • QUEtiapine (SEROquel) 50 MG tablet    1/10/2020 at Unknown time   • Vortioxetine HBr 10 MG tablet Take 10 mg by mouth Daily.   1/10/2020 at Unknown time   • QUEtiapine (SEROquel) 25 MG tablet Take 50 mg by mouth Every Night.   Taking     Allergies   Allergen  Reactions   • Amoxicillin Diarrhea   • Contrast Dye Other (See Comments)     CAUSED ELEV BP   • Heparin Other (See Comments)     CAUSED MORE CLOTTING AFTER AORTA BIFEM - DR OJEDA   • Iodinated Diagnostic Agents Other (See Comments)     Other reaction(s): Other (See Comments)  CAUSED ELEV BP  CAUSED ELEV BP  CAUSED ELEV BP  CAUSED ELEV BP   • Erythromycin Diarrhea   • Meperidine GI Intolerance     N/V   • Sulfa Antibiotics Diarrhea     N/V   • Baclofen Other (See Comments)     Altered mental status       Review of Systems   Constitutional: Negative for chills and fever.   HENT: Negative for congestion, postnasal drip and trouble swallowing.    Respiratory: Positive for shortness of breath. Negative for cough and wheezing.    Cardiovascular: Positive for leg swelling. Negative for chest pain.   Gastrointestinal: Negative for abdominal pain, diarrhea, nausea and vomiting.   Endocrine: Negative for cold intolerance and heat intolerance.   Genitourinary: Negative for frequency and urgency.   Musculoskeletal: Negative for arthralgias and back pain.   Skin: Negative for pallor and rash.   Neurological: Negative for seizures and headaches.   Psychiatric/Behavioral: Negative for confusion. The patient is not nervous/anxious.      Vital Signs past 24hrs  BP range: BP: (136-152)/(63-67) 152/63  Pulse range: Heart Rate:  [63-70] 66  Resp rate range: Resp:  [18-20] 20  Temp range: Temp (24hrs), Av.7 °F (36.5 °C), Min:97.6 °F (36.4 °C), Max:97.7 °F (36.5 °C)    Oxygen range: SpO2:  [94 %] 94 %; Flow (L/min):  [2] 2;   Device (Oxygen Therapy): nasal cannula  61 kg (134 lb 7.7 oz); Body mass index is 22.96 kg/m².  No intake/output data recorded.    Adult female sitting up in bed.  On supplemental oxygen with adequate saturation.  No distress.  Pupils equal and reactive to light.  Oropharynx moist with a class II Mallampati airway no posterior pharyngeal discharge.  Nasopharynx without discharge septum midline.  JVP not elevated  but patient is sitting up.  Trachea midline thyroid not enlarged.  Lungs reveal bilateral air entry overall diminished but equal.  Few fine basilar rales.  Percussion note resonant chest expansion equal no chest wall deformity or tenderness.  Heart examination S1-S2 present appears irregular on the monitor.  No murmur noted.  Edema noted in lower extremity at least 1-2+.  No peripheral cyanosis clubbing.  Moves all 4 extremities sensorimotor intact.  No cervical, axillary, inguinal adenopathy.    Results Review:    I have reviewed the laboratory and imaging data from current admission. My annotations are as noted in assessment and plan.    Medication Review:  I have reviewed the current MAR. My annotations are as noted in assessment and plan.    Plan   PCCM Problems  Dyspnea with acute respiratory failure, hypoxia  Acute diastolic congestive heart failure  Recent amiodarone treatment  New diagnosis hypothyroidism  Acute kidney injury  A. fib on anticoagulation status post cardioversion  History CVA  Ex-smoker with no formal diagnosis COPD  Anemia      Plan of Treatment  Currently with adequate saturation.  Keep on low-flow oxygen and wean as tolerated.    Dyspnea reported since November.  Lab work points towards acute congestive heart failure including chest x-ray done at [facility].  Defer to cardiology for treatment with diuretics.    Recent amiodarone treatment and concern for amiodarone lung toxicity.  At this point I recommend treatment with diuretics.  If persistent infiltrates once patient is euvolemic, then will consider further imaging with high-resolution CT chest.  In any case patient is now off amiodarone.    New diagnosis hypothyroidism.  This can contribute to shortness of breath.  Full thyroid profile was added to morning labs.  She may benefit from an endocrine consult.  This could be undiagnosed hypothyroidism or possibly caused by amiodarone.    Acute kidney injury noted on labs.  Unclear etiology.   Could be related to volume status.  May need nephrology input.    A. fib on anticoagulation and currently appears to have a normal rhythm.  Cardiology will manage.    Significant smoking history.  At least 40 pack years.  Quit smoking 25 years ago.  No formal diagnosis COPD but would benefit from PFTs outpatient.    Anemia noted and can contribute to shortness of breath.  Will check iron profile.      Part of this note may be an electronic transcription/translation of spoken language to printed text using the Dragon Dictation System.

## 2020-01-11 NOTE — NURSING NOTE
Patient admitted with new onset heart failure. Heart failure brochure and zones given and taught to patient.

## 2020-01-11 NOTE — PLAN OF CARE
Decreased appetite, intake 0% of breakfast this a.m. On HH/CC diet. Boost Glucose Control ordered BID with meals to assist in meeting nutritional needs.

## 2020-01-11 NOTE — CONSULTS
"Adult Nutrition  Assessment/PES    Patient Name:  Radha Alfred  YOB: 1945  MRN: [id number]  Admit Date:  1/11/2020    Assessment Date:  1/11/2020    Comments:  Consult: MST 1, decreased appetite. 0% intake of breakfast on HH/CC diet. Wt fluctuates d/t chronic leg swelling. 2+ Ezra edema in feet. New onset of CHF, may need diet education for cardiac diet prior to d/c. BGC ordered BID to assist with meeting nutritional needs. Will continue to monitor.     Reason for Assessment     Row Name 01/11/20 1557          Reason for Assessment    Reason For Assessment  nurse/nurse practitioner consult     Diagnosis  cardiac disease New onset of CHF with hx of Afib, DM2, GERD, HTN, HLD, IBS, MVP, PVD, and CVA's     Identified At Risk by Screening Criteria  reduced oral intake over the last month         Nutrition/Diet History     Row Name 01/11/20 1558          Nutrition/Diet History    Typical Food/Fluid Intake  MST 1, decreased appetite. 0% intake of breakfast on HH/CC diet. Wt fluctuates d/t chronic leg swelling. 2+ Ezra edema in feet.      Factors Affecting Nutritional Intake  early satiety         Anthropometrics     Row Name 01/11/20 1601 01/11/20 1559       Anthropometrics    Height  162.6 cm (64\")  162.6 cm (64\")    Weight  --  61 kg (134 lb 7.7 oz)       Ideal Body Weight (IBW)    Ideal Body Weight (IBW) (kg)  55  55    % Ideal Body Weight  --  110.9       Body Mass Index (BMI)    BMI (kg/m2)  --  23.13    BMI Assessment  --  BMI 18.5-24.9: normal    Row Name 01/11/20 1114          Anthropometrics    Height  163 cm (64.17\")     Weight  61 kg (134 lb 7.7 oz)        Ideal Body Weight (IBW)    Ideal Body Weight (IBW) (kg)  55.4     % Ideal Body Weight  110.11        Body Mass Index (BMI)    BMI (kg/m2)  23.01         Labs/Tests/Procedures/Meds     Row Name 01/11/20 1600          Labs/Procedures/Meds    Lab Results Reviewed  reviewed, pertinent     Lab Results Comments  CO2, Cr, Ca, GFR, H/H        Diagnostic " "Tests/Procedures    Diagnostic Test/Procedure Reviewed  reviewed        Medications    Pertinent Medications Reviewed  reviewed, pertinent     Pertinent Medications Comments  protonix         Physical Findings     Row Name 01/11/20 1601          Physical Findings    Overall Physical Appearance  loss of muscle mass;on oxygen therapy     Skin  other (see comments) B=20         Estimated/Assessed Needs     Row Name 01/11/20 1601 01/11/20 1559       Calculation Measurements    Weight Used For Calculations  61 kg (134 lb 7.7 oz)  --    Height  162.6 cm (64\")  162.6 cm (64\")       Estimated/Assessed Needs    Additional Documentation  Fluid Requirements (Group);Protein Requirements (Group);KCAL/KG (Group)  --       KCAL/KG    KCAL/KG  25 Kcal/Kg (kcal);30 Kcal/Kg (kcal)  --    25 Kcal/Kg (kcal)  1525  --    30 Kcal/Kg (kcal)  1830  --       Protein Requirements    Weight Used For Protein Calculations  61 kg (134 lb 7.7 oz)  --    Est Protein Requirement Amount (gms/kg)  1.2 gm protein  --    Estimated Protein Requirements (gms/day)  73.2  --       Fluid Requirements    Estimated Fluid Requirements (mL/day)  1830  --    Estimated Fluid Requirement Method  RDA Method  --    RDA Method (mL)  1830  --          Row Name 01/11/20 1114          Calculation Measurements    Height  163 cm (64.17\")         Nutrition Prescription Ordered     Row Name 01/11/20 1602          Nutrition Prescription PO    Current PO Diet  Regular     Fluid Consistency  Thin     Common Modifiers  Consistent Carbohydrate;Cardiac           Problem/Interventions:  Problem 1     Row Name 01/11/20 1603          Nutrition Diagnoses Problem 1    Problem 1  Inadequate Intake/Infusion     Inadequate Intake Type  Oral     Etiology (related to)  Factors Affecting Nutrition     Appetite  Poor at this Time;Poor Whenever Ill     Signs/Symptoms (evidenced by)  Report of Mnimal PO Intake;PO Intake     Percent (%) intake recorded  0 %           Intervention Goal     Row " Name 01/11/20 1603          Intervention Goal    General  Maintain nutrition;Meet nutritional needs for age/condition;Disease management/therapy     PO  Increase intake;Meet estimated needs     Weight  Maintain weight         Nutrition Intervention     Row Name 01/11/20 1604          Nutrition Intervention    RD/Tech Action  Care plan reviewd;Follow Tx progress;Encourage intake;Recommend/ordered     Recommended/Ordered  Supplement         Nutrition Prescription     Row Name 01/11/20 1604          Nutrition Prescription PO    PO Prescription  Begin/change supplement     Supplement  Boost Glucose Control     Supplement Frequency  2 times a day     New PO Prescription Ordered?  Yes         Education/Evaluation     Row Name 01/11/20 1604          Education    Education  Will Instruct as appropriate        Monitor/Evaluation    Monitor  Per protocol;PO intake;Supplement intake;Pertinent labs;Weight;Symptoms     Education Follow-up  Reinforce PRN           Electronically signed by:  Chanell Sahni, MS,RD,LD  01/11/20 4:04 PM

## 2020-01-11 NOTE — H&P
Cardiology History & Physical / Consultation      Patient Name: Radha Alfred  Age/Sex: 74 y.o. female  : 1945  MRN: [id number]    Date of Admission: 2020  Date of Encounter Visit: 20  Encounter Provider: Kristopher Hughes MD  Referring Provider: Kristopher Hughes MD  Place of Service: [facility] CARDIOLOGY  Patient Care Team:  Fermin Pittman MD as PCP - General (Internal Medicine)          Subjective:     Chief Complaint: Shortness of breath      History of Present Illness:  Radha Alfred is a 74 y.o. female who is usually followed by Dr. Burnett.  Patient was recently seen on 2020 in which she was having worsening shortness of breath.  She presented to [facility].  Patient had history of paroxysmal atrial fibrillation and had undergone cardioversion first part of November.  She said ever since then she has slowly gotten more more short of breath.  She says even walking to the bathroom now she gets incredibly winded.  Patient is also been wheezing.  The amiodarone was discontinued about 3 days ago and patient was placed on atenolol.  The fatigue and shortness of breath had been going on for several months has been progressive despite decreasing the dosage as an outpatient.  Patient has also had a little bit of tightness in her chest.      Past Medical History:  Past Medical History:   Diagnosis Date   • Anxiety    • Arthritis    • Atrial fibrillation (CMS/HCC)    • Cerebral infarction (CMS/HCC)    • Chest pain    • Diabetes mellitus (CMS/HCC)     TYPE II   • Edema of lower extremity    • Elevated cholesterol    • GERD (gastroesophageal reflux disease)    • Hyperlipidemia    • Hypertension    • IBS (irritable bowel syndrome)    • MVP (mitral valve prolapse)    • New onset of congestive heart failure (CMS/HCC) 2020   • PVD (peripheral vascular disease) (CMS/HCC)    • Stroke (CMS/HCC)     has had 2, 22 yr ago  and 06/16   • Venous thromboembolism        Past Surgical History:   Procedure Laterality Date   • AORTA - FEMORAL ARTERY BYPASS GRAFT  1995   • CARDIAC CATHETERIZATION     • CHOLECYSTECTOMY     • CHOLECYSTECTOMY     • COLONOSCOPY     • HEMORRHOIDECTOMY     • HERNIA REPAIR     • HYSTERECTOMY     • TOE AMPUTATION         Home Medications:   Medications Prior to Admission   Medication Sig Dispense Refill Last Dose   • amLODIPine (NORVASC) 10 MG tablet 1 TAB DAILY 30 tablet 3 1/10/2020 at Unknown time   • aspirin 81 MG EC tablet Take 81 mg by mouth Daily.      • atenolol (TENORMIN) 25 MG tablet Take 1 tablet by mouth Daily. 30 tablet 11 1/10/2020 at Unknown time   • atorvastatin (LIPITOR) 40 MG tablet TAKE ONE TABLET BY MOUTH EVERY NIGHT AT BEDTIME   1/10/2020 at Unknown time   • ELIQUIS 5 MG tablet tablet Take 5 mg by mouth 2 (Two) Times a Day.   1/10/2020 at Unknown time   • hydroxychloroquine (PLAQUENIL) 200 MG tablet Take 200 mg by mouth Daily. TAKE ONE TABLET BY MOUTH TWICE A DAY   1/10/2020 at Unknown time   • loperamide (IMODIUM A-D) 2 MG tablet Take 2 mg by mouth 4 (four) times a day.   1/10/2020 at Unknown time   • losartan (COZAAR) 100 MG tablet Take 1 tablet by mouth Daily. 30 tablet 11 1/10/2020 at Unknown time   • melatonin 5 MG tablet tablet Take 5 mg by mouth Every Night.   Past Week at Unknown time   • pantoprazole (PROTONIX) 40 MG EC tablet Take 40 mg by mouth Daily.   1/10/2020 at Unknown time   • QUEtiapine (SEROquel) 50 MG tablet    1/10/2020 at Unknown time   • Vortioxetine HBr 10 MG tablet Take 10 mg by mouth Daily.   1/10/2020 at Unknown time   • QUEtiapine (SEROquel) 25 MG tablet Take 50 mg by mouth Every Night.   Taking       Allergies:  Allergies   Allergen Reactions   • Amoxicillin Diarrhea   • Contrast Dye Other (See Comments)     CAUSED ELEV BP   • Heparin Other (See Comments)     CAUSED MORE CLOTTING AFTER AORTA MIO OJEDA   • Iodinated Diagnostic Agents Other (See Comments)      Other reaction(s): Other (See Comments)  CAUSED ELEV BP  CAUSED ELEV BP  CAUSED ELEV BP  CAUSED ELEV BP   • Erythromycin Diarrhea   • Meperidine GI Intolerance     N/V   • Sulfa Antibiotics Diarrhea     N/V   • Baclofen Other (See Comments)     Altered mental status       Past Social History:  Social History     Socioeconomic History   • Marital status:      Spouse name: Not on file   • Number of children: Not on file   • Years of education: Not on file   • Highest education level: Not on file   Tobacco Use   • Smoking status: Former Smoker   • Smokeless tobacco: Never Used   Substance and Sexual Activity   • Alcohol use: No   • Drug use: No   • Sexual activity: Defer     Birth control/protection: Post-menopausal, Surgical       Past Family History: History reviewed. No pertinent family history.   Family History   Problem Relation Age of Onset   • Diabetes Mother    • Heart disease Mother    • Hypertension Mother    • Hyperlipidemia Mother    • COPD Mother    • Heart disease Father    • Hyperlipidemia Father    • Hypertension Father    • Stroke Father    • Diabetes Father        Review of Systems   Respiratory: Positive for chest tightness, shortness of breath and wheezing.    All other systems reviewed and are negative.          Objective:     Objective:  Temp:  [97.6 °F (36.4 °C)-97.7 °F (36.5 °C)] 97.7 °F (36.5 °C)  Heart Rate:  [63-70] 70  Resp:  [18-20] 20  BP: (136-152)/(63-67) 152/63    Intake/Output Summary (Last 24 hours) at 1/11/2020 1019  Last data filed at 1/11/2020 0649  Gross per 24 hour   Intake --   Output 1000 ml   Net -1000 ml     Body mass index is 23.17 kg/m².      01/11/20  0350   Weight: 61.2 kg (135 lb)           Physical Exam:   Physical Exam   Constitutional: She is oriented to person, place, and time. She appears well-developed.   HENT:   Head: Normocephalic.   Eyes: Conjunctivae are normal.   Neck: Normal range of motion.   Cardiovascular: Normal rate, regular rhythm and normal  heart sounds.   Pulmonary/Chest: Breath sounds normal.   Abdominal: Soft. Bowel sounds are normal.   Musculoskeletal: Normal range of motion. She exhibits no edema.   Neurological: She is alert and oriented to person, place, and time.   Skin: Skin is warm and dry.   Psychiatric: She has a normal mood and affect. Her behavior is normal.   Vitals reviewed.       Labs:   Lab Review:     Results from last 7 days   Lab Units 01/11/20  0908   SODIUM mmol/L 144   POTASSIUM mmol/L 3.6   CHLORIDE mmol/L 107   CO2 mmol/L 21.2*   BUN mg/dL 16   CREATININE mg/dL 1.44*   GLUCOSE mg/dL 94   CALCIUM mg/dL 8.5*   AST (SGOT) U/L 18   ALT (SGPT) U/L 11         Results from last 7 days   Lab Units 01/11/20  0908   WBC 10*3/mm3 5.65   HEMOGLOBIN g/dL 9.7*   HEMATOCRIT % 30.4*   PLATELETS 10*3/mm3 230                                 Assessment:       New onset of congestive heart failure (CMS/HCC)        Plan:     1.  Shortness of breath.  Appears to have been persistent and progressively getting worse.  Patient was cardioverted first part of November and had been initiated on amiodarone at that time.  The amiodarone was just discontinued several days ago but I am concerned that it could be a pulmonary component of the amiodarone causing some of her symptoms.  She had an echo done on November 4, 2019 showed mild mitral regurgitation ejection fraction of 64%.  At this point I am going to repeat her echo I am going to ask pulmonology to see for possible amiodarone toxicity.  2.  History of atrial fibrillation remains in sinus rhythm.  Patient currently remains on Eliquis as well as atenolol.  3.  Blood work not remarkable we will check a thyroid as well as a BN P.    Thank you for allowing me to participate in the care of Radha Alfred. Feel free to contact me directly with any further questions or concerns.    Kristopher Hughes MD  [facility]  01/11/20  10:19 AM

## 2020-01-11 NOTE — PLAN OF CARE
Problem: Patient Care Overview  Goal: Plan of Care Review  Outcome: Ongoing (interventions implemented as appropriate)  Flowsheets (Taken 1/11/2020 6840)  Progress: improving  Plan of Care Reviewed With: patient; daughter  Outcome Summary: Pt continues to have moderate edema in zeke lower ext. Lasix given prior to admission at [facility]. No additional Lasix given. Echo completed this am. Pt up to BR with minimal assist. Pul Consult completed this am. O2 sats dropped to 86% on RA. Pt continues on O2 at 2 L/NC. Continue to monitor. Pt denies any chest pain.

## 2020-01-12 PROBLEM — E03.2 HYPOTHYROIDISM DUE TO MEDICATION: Status: ACTIVE | Noted: 2020-01-12

## 2020-01-12 PROBLEM — N18.30 CHRONIC KIDNEY DISEASE, STAGE 3 (HCC): Status: ACTIVE | Noted: 2020-01-12

## 2020-01-12 LAB
ANION GAP SERPL CALCULATED.3IONS-SCNC: 14.9 MMOL/L (ref 5–15)
BUN BLD-MCNC: 14 MG/DL (ref 8–23)
BUN/CREAT SERPL: 9.9 (ref 7–25)
CALCIUM SPEC-SCNC: 8.3 MG/DL (ref 8.6–10.5)
CHLORIDE SERPL-SCNC: 108 MMOL/L (ref 98–107)
CO2 SERPL-SCNC: 21.1 MMOL/L (ref 22–29)
CREAT BLD-MCNC: 1.41 MG/DL (ref 0.57–1)
DEPRECATED RDW RBC AUTO: 51.7 FL (ref 37–54)
ERYTHROCYTE [DISTWIDTH] IN BLOOD BY AUTOMATED COUNT: 15.8 % (ref 12.3–15.4)
GFR SERPL CREATININE-BSD FRML MDRD: 36 ML/MIN/1.73
GLUCOSE BLD-MCNC: 94 MG/DL (ref 65–99)
HCT VFR BLD AUTO: 30.6 % (ref 34–46.6)
HGB BLD-MCNC: 9.7 G/DL (ref 12–15.9)
MCH RBC QN AUTO: 28.1 PG (ref 26.6–33)
MCHC RBC AUTO-ENTMCNC: 31.7 G/DL (ref 31.5–35.7)
MCV RBC AUTO: 88.7 FL (ref 79–97)
PLATELET # BLD AUTO: 216 10*3/MM3 (ref 140–450)
PMV BLD AUTO: 11.2 FL (ref 6–12)
POTASSIUM BLD-SCNC: 3.7 MMOL/L (ref 3.5–5.2)
RBC # BLD AUTO: 3.45 10*6/MM3 (ref 3.77–5.28)
SODIUM BLD-SCNC: 144 MMOL/L (ref 136–145)
T3FREE SERPL-MCNC: 1.97 PG/ML (ref 2–4.4)
T4 FREE SERPL-MCNC: 1.19 NG/DL (ref 0.93–1.7)
WBC NRBC COR # BLD: 5.36 10*3/MM3 (ref 3.4–10.8)

## 2020-01-12 PROCEDURE — 84481 FREE ASSAY (FT-3): CPT | Performed by: HOSPITALIST

## 2020-01-12 PROCEDURE — 84439 ASSAY OF FREE THYROXINE: CPT | Performed by: HOSPITALIST

## 2020-01-12 PROCEDURE — 80048 BASIC METABOLIC PNL TOTAL CA: CPT | Performed by: INTERNAL MEDICINE

## 2020-01-12 PROCEDURE — 85027 COMPLETE CBC AUTOMATED: CPT | Performed by: INTERNAL MEDICINE

## 2020-01-12 PROCEDURE — 99232 SBSQ HOSP IP/OBS MODERATE 35: CPT | Performed by: INTERNAL MEDICINE

## 2020-01-12 PROCEDURE — 25010000002 FUROSEMIDE PER 20 MG: Performed by: INTERNAL MEDICINE

## 2020-01-12 RX ORDER — FUROSEMIDE 10 MG/ML
40 INJECTION INTRAMUSCULAR; INTRAVENOUS EVERY 6 HOURS
Status: COMPLETED | OUTPATIENT
Start: 2020-01-12 | End: 2020-01-12

## 2020-01-12 RX ADMIN — APIXABAN 5 MG: 5 TABLET, FILM COATED ORAL at 08:34

## 2020-01-12 RX ADMIN — SODIUM CHLORIDE, PRESERVATIVE FREE 10 ML: 5 INJECTION INTRAVENOUS at 12:39

## 2020-01-12 RX ADMIN — APIXABAN 5 MG: 5 TABLET, FILM COATED ORAL at 21:06

## 2020-01-12 RX ADMIN — ATORVASTATIN CALCIUM 40 MG: 20 TABLET, FILM COATED ORAL at 21:06

## 2020-01-12 RX ADMIN — FUROSEMIDE 40 MG: 40 INJECTION, SOLUTION INTRAMUSCULAR; INTRAVENOUS at 12:38

## 2020-01-12 RX ADMIN — Medication 5 MG: at 21:06

## 2020-01-12 RX ADMIN — HYDROXYCHLOROQUINE SULFATE 200 MG: 200 TABLET, FILM COATED ORAL at 08:34

## 2020-01-12 RX ADMIN — SODIUM CHLORIDE, PRESERVATIVE FREE 10 ML: 5 INJECTION INTRAVENOUS at 21:06

## 2020-01-12 RX ADMIN — AMLODIPINE BESYLATE 10 MG: 10 TABLET ORAL at 18:36

## 2020-01-12 RX ADMIN — DIAZEPAM 2 MG: 2 TABLET ORAL at 21:12

## 2020-01-12 RX ADMIN — QUETIAPINE FUMARATE 50 MG: 50 TABLET, FILM COATED ORAL at 21:06

## 2020-01-12 RX ADMIN — LOSARTAN POTASSIUM 100 MG: 50 TABLET, FILM COATED ORAL at 08:34

## 2020-01-12 RX ADMIN — ASPIRIN 81 MG: 81 TABLET, COATED ORAL at 08:34

## 2020-01-12 RX ADMIN — ATENOLOL 25 MG: 25 TABLET ORAL at 08:34

## 2020-01-12 RX ADMIN — DIAZEPAM 2 MG: 2 TABLET ORAL at 08:34

## 2020-01-12 RX ADMIN — PANTOPRAZOLE SODIUM 40 MG: 40 TABLET, DELAYED RELEASE ORAL at 08:34

## 2020-01-12 RX ADMIN — FUROSEMIDE 40 MG: 40 INJECTION, SOLUTION INTRAMUSCULAR; INTRAVENOUS at 18:39

## 2020-01-12 NOTE — PROGRESS NOTES
"Patient Name: Radha Alfred  :1945  74 y.o.      Patient Care Team:  Fermin Pittman MD as PCP - General (Internal Medicine)    Interval History:   Ins and outs not accurately measured.  Weights and accurate.    Subjective:  Following for dyspnea.    Objective   Vital Signs  Temp:  [87 °F (30.6 °C)-98.1 °F (36.7 °C)] 97.7 °F (36.5 °C)  Heart Rate:  [60-67] 67  Resp:  [20] 20  BP: (120-160)/(55-69) 155/65    Intake/Output Summary (Last 24 hours) at 2020 1118  Last data filed at 2020 0734  Gross per 24 hour   Intake 0 ml   Output 1200 ml   Net -1200 ml     Flowsheet Rows      First Filed Value   Admission Height  162.6 cm (64\") Documented at 2020 0350   Admission Weight  61.2 kg (135 lb) Documented at 2020 0350          Physical Exam:   General Appearance:    Alert, cooperative, in no acute distress   Lungs:     Clear to auscultation.  Normal respiratory effort and rate.      Heart:    Regular rhythm and normal rate, normal S1 and S2, no murmurs, gallops or rubs.     Chest Wall:    No abnormalities observed   Abdomen:     Soft, nontender, positive bowel sounds.     Extremities:   no cyanosis, clubbing or edema.  No marked joint deformities.  Adequate musculoskeletal strength.       Results Review:    Results from last 7 days   Lab Units 20  0333   SODIUM mmol/L 144   POTASSIUM mmol/L 3.7   CHLORIDE mmol/L 108*   CO2 mmol/L 21.1*   BUN mg/dL 14   CREATININE mg/dL 1.41*   GLUCOSE mg/dL 94   CALCIUM mg/dL 8.3*         Results from last 7 days   Lab Units 20  0333   WBC 10*3/mm3 5.36   HEMOGLOBIN g/dL 9.7*   HEMATOCRIT % 30.6*   PLATELETS 10*3/mm3 216                         Medication Review:     amLODIPine 10 mg Oral Q24H   apixaban 5 mg Oral Q12H   aspirin 81 mg Oral Daily   atenolol 25 mg Oral Daily   atorvastatin 40 mg Oral Nightly   hydroxychloroquine 200 mg Oral Daily   losartan 100 mg Oral Q24H   melatonin 5 mg Oral Nightly   pantoprazole 40 mg Oral Daily   QUEtiapine " 50 mg Oral Nightly   sodium chloride 10 mL Intravenous Q12H             Assessment/Plan     1.  Shortness of breath.  Elevated BNP.  Echocardiogram yesterday showed normal LV function ejection fraction 68% with moderate mitral regurgitation, mild tricuspid regurgitation with mild pulmonary hypertension.  I will try a diuretic on her today.  2.  Moderate mitral regurgitation  3.  Mild pulmonary hypertension  4.  Anemia.  5.  Chronic kidney disease.  We will try some diuretics and see if this helps.  6.  History of atrial fibrillation.  On abciximab.  Sinus rhythm.  7.  Systemic hypertension.    -We will try IV diuretics today.  -Medicine consult for noncardiac issues such as anemia, chronic kidney disease, thyroid problems.  -Still requiring oxygen.  -Weights on a standing scale.    Geovanan Zapata MD, [facility]  01/12/20  11:18 AM

## 2020-01-12 NOTE — PROGRESS NOTES
[facility]  [phone]  Elton Cespedes MD    Subjective:  LOS: 1    She got some Lasix and feels somewhat better.  Denies cough phlegm or wheezing.    Objective   Vital Signs past 24hrs    Temp range: Temp (24hrs), Av.7 °F (35.4 °C), Min:87 °F (30.6 °C), Max:98.1 °F (36.7 °C)    BP range: BP: (120-160)/(55-69) 155/65  Pulse range: Heart Rate:  [60-67] 67  Resp rate range: Resp:  [20] 20    Device (Oxygen Therapy): nasal cannulaFlow (L/min):  [2] 2  Oxygen range:SpO2:  [87 %-97 %] 97 %      61 kg (134 lb 7.7 oz); Body mass index is 23.08 kg/m².    Intake/Output Summary (Last 24 hours) at 2020 1237  Last data filed at 2020 0734  Gross per 24 hour   Intake 0 ml   Output 1200 ml   Net -1200 ml       Physical Exam   Constitutional: She appears well-developed.   HENT:   Head: Normocephalic.   Eyes: Pupils are equal, round, and reactive to light.   Cardiovascular: Normal rate and regular rhythm.   No murmur heard.  Pulmonary/Chest: Effort normal. No respiratory distress. She has decreased breath sounds. She has no wheezes. She has rales (few bibasilar).   Abdominal: Soft. Bowel sounds are normal. She exhibits no mass. There is no tenderness.   Musculoskeletal: She exhibits no edema.   Skin: No rash noted.     Results Review:    I have reviewed the laboratory and imaging data since the last note by [facility] physician.  My annotations are noted in assessment and plan.    Medication Review:  I have reviewed the current MAR.  My annotations are noted in assessment and plan.       Plan   PCCM Problems  Dyspnea with acute respiratory failure, hypoxia  Acute diastolic congestive heart failure  Recent amiodarone treatment  New diagnosis hypothyroidism  Acute kidney injury  A. fib on anticoagulation status post cardioversion  History CVA  Ex-smoker with no formal diagnosis COPD  Anemia      Plan of Treatment  Diuretics as per cardiology service.  Chest x-ray in the morning to review any persistent  infiltrates.  If despite appropriate diuresis still evidence of interstitial changes then will consider further work-up for amiodarone lung toxicity. Please note presently off amiodarone and it appears no plans to resume.    Ex-smoker and likely underlying COPD.  Requires outpatient PFTs.    Currently on supplemental oxygen.  Wean as tolerated and will need assessment for home oxygen at the time of discharge.    Discussed with hospitalist.    Elton Cespedes MD  01/12/20  12:37 PM    Part of this note may be an electronic transcription/translation of spoken language to printed text using the Dragon Dictation System.

## 2020-01-12 NOTE — CONSULTS
Patient Name:  Radha Alfred  YOB: 1945  MRN:  [id number]  Date of Admission:  1/11/2020  Date of Consult:  1/12/2020  Patient Care Team:  Fermin Pittman MD as PCP - General (Internal Medicine)    Inpatient Internal Medicine Consult  Consult performed by: Morgan Spencer MD  Consult ordered by: Geovanna Zapata MD  Reason for consult: Evaluate status and make recommendations regarding treatment for abnormal thyroid studies, anemia and CKD        Subjective   History of Present Illness  Ms. Alfred is a 74 y.o. female that has been admitted to [facility] due to shortness of breath, weakness etc.  She has been admitted by the cardiology service and and we were asked to see and assist with her medical problems, specifically relating to her thyroid, hemoglobin and renal function.  Patient feels much better since the hospital.  She is been treated with nasal cannula oxygen thus far.  Seen by cardiology today and ordered IV diuresis.  She reports some chronic swelling in her lower extremities slightly worse than usual.  She has shortness of breath worse with exertion.  Initial evaluation concerning for congestive heart failure.      Past Medical History:   Diagnosis Date   • Anxiety    • Arthritis    • Atrial fibrillation (CMS/HCC)    • Cerebral infarction (CMS/HCC)    • Chest pain    • Diabetes mellitus (CMS/HCC)     TYPE II   • Edema of lower extremity    • Elevated cholesterol    • GERD (gastroesophageal reflux disease)    • Hyperlipidemia    • Hypertension    • IBS (irritable bowel syndrome)    • MVP (mitral valve prolapse)    • New onset of congestive heart failure (CMS/HCC) 1/11/2020   • PVD (peripheral vascular disease) (CMS/HCC)    • Stroke (CMS/HCC)     has had 2, 22 yr ago and 06/16   • Venous thromboembolism      Past Surgical History:   Procedure Laterality Date   • AORTA - FEMORAL ARTERY BYPASS GRAFT  1995   • CARDIAC CATHETERIZATION     • CHOLECYSTECTOMY     •  CHOLECYSTECTOMY     • COLONOSCOPY     • HEMORRHOIDECTOMY     • HERNIA REPAIR     • HYSTERECTOMY     • TOE AMPUTATION       Family History   Problem Relation Age of Onset   • Diabetes Mother    • Heart disease Mother    • Hypertension Mother    • Hyperlipidemia Mother    • COPD Mother    • Heart disease Father    • Hyperlipidemia Father    • Hypertension Father    • Stroke Father    • Diabetes Father      Social History     Tobacco Use   • Smoking status: Former Smoker   • Smokeless tobacco: Never Used   Substance Use Topics   • Alcohol use: No   • Drug use: No     Medications Prior to Admission   Medication Sig Dispense Refill Last Dose   • amLODIPine (NORVASC) 10 MG tablet 1 TAB DAILY 30 tablet 3 1/10/2020 at Unknown time   • aspirin 81 MG EC tablet Take 81 mg by mouth Daily.      • atenolol (TENORMIN) 25 MG tablet Take 1 tablet by mouth Daily. 30 tablet 11 1/10/2020 at Unknown time   • atorvastatin (LIPITOR) 40 MG tablet TAKE ONE TABLET BY MOUTH EVERY NIGHT AT BEDTIME   1/10/2020 at Unknown time   • ELIQUIS 5 MG tablet tablet Take 5 mg by mouth 2 (Two) Times a Day.   1/10/2020 at Unknown time   • hydroxychloroquine (PLAQUENIL) 200 MG tablet Take 200 mg by mouth Daily. TAKE ONE TABLET BY MOUTH TWICE A DAY   1/10/2020 at Unknown time   • loperamide (IMODIUM A-D) 2 MG tablet Take 2 mg by mouth 4 (four) times a day.   1/10/2020 at Unknown time   • losartan (COZAAR) 100 MG tablet Take 1 tablet by mouth Daily. 30 tablet 11 1/10/2020 at Unknown time   • melatonin 5 MG tablet tablet Take 5 mg by mouth Every Night.   Past Week at Unknown time   • pantoprazole (PROTONIX) 40 MG EC tablet Take 40 mg by mouth Daily.   1/10/2020 at Unknown time   • QUEtiapine (SEROquel) 50 MG tablet    1/10/2020 at Unknown time   • Vortioxetine HBr 10 MG tablet Take 10 mg by mouth Daily.   1/10/2020 at Unknown time   • QUEtiapine (SEROquel) 25 MG tablet Take 50 mg by mouth Every Night.   Taking     Allergies:  Amoxicillin; Contrast dye;  Heparin; Iodinated diagnostic agents; Erythromycin; Meperidine; Sulfa antibiotics; and Baclofen    Review of Systems   Constitutional: Positive for fatigue.   HENT: Negative.    Respiratory: Positive for shortness of breath.    Cardiovascular: Positive for leg swelling.   Gastrointestinal: Negative.    Endocrine: Negative.    Genitourinary: Negative.    Musculoskeletal: Negative.    Skin: Negative.    Neurological: Negative.    Hematological: Negative.    Psychiatric/Behavioral: Negative.        Objective      Vital Signs  Temp:  [87 °F (30.6 °C)-98 °F (36.7 °C)] 97.7 °F (36.5 °C)  Heart Rate:  [60-67] 67  Resp:  [20] 20  BP: (120-160)/(55-69) 155/65  Body mass index is 22.14 kg/m².    Physical Exam   Constitutional: She is oriented to person, place, and time. She appears well-developed and well-nourished. No distress.   Chronically ill appearing, no distress   HENT:   Head: Normocephalic and atraumatic.   Eyes: Conjunctivae and EOM are normal. No scleral icterus.   Neck: Normal range of motion. No JVD present.   Cardiovascular: Normal rate. An irregularly irregular rhythm present.   Pulmonary/Chest: Effort normal. She has decreased breath sounds in the right lower field. She has rales in the left lower field.   Abdominal: Soft. Bowel sounds are normal. She exhibits no distension. There is no tenderness.   Musculoskeletal: Normal range of motion. She exhibits edema.   Neurological: She is alert and oriented to person, place, and time.   Skin: Skin is warm and dry.   Psychiatric: She has a normal mood and affect. Her behavior is normal.   Nursing note and vitals reviewed.      Results Review:   I reviewed the patient's new clinical results.  I reviewed the patient's new imaging results and agree with the interpretation.  I reviewed the patient's other test results and agree with the interpretation  I personally viewed and interpreted the patient's EKG/Telemetry data  Results from last 7 days   Lab Units  01/11/20  0908   TSH uIU/mL 27.000*            Assessment/Plan     Active Hospital Problems    Diagnosis POA   • **New onset of congestive heart failure (CMS/HCC) [I50.9] Yes   • Chronic kidney disease, stage 3 (CMS/HCC) [N18.3] Yes   • Hypothyroidism due to medication [E03.2] Yes   • Paroxysmal atrial fibrillation (CMS/HCC) [I48.0] Yes   • Type 2 diabetes mellitus (CMS/HCC) [E11.9] Yes     Overview:   DIET CONTROLLED - NO MEDS     • Hypertension [I10] Yes   • Anemia [D64.9] Yes       · Patient started amiodarone in early November.  At that time TSH was slightly elevated at 6.9.  Since being on amiodarone this has gone up to a level of 27 yesterday.  Certainly some of her symptoms could be caused by hypothyroidism.  Per my discussion with cardiology the plan is to remain off amiodarone at this point.  Follow-up additional thyroid studies in a.m.  Anticipate TSH to improve off amiodarone but needs further monitoring in the outpatient setting.  · Creatinine slightly elevated.  To baseline.  Continue monitoring with diuresis.  · She has chronic anemia.  No symptoms to suggest any recent bleeding.  Iron saturation slightly low.  · Blood sugar normal on chemistry.  Diet control only.  · Discussed with Dr. Cespedes and Dr. Zapata.      Thank you very much for asking [facility] to be involved in this patient's care. We will follow along with you.      Morgan Spencer MD  [facility]  01/12/20  1:34 PM

## 2020-01-12 NOTE — PLAN OF CARE
Problem: Patient Care Overview  Goal: Plan of Care Review  Outcome: Ongoing (interventions implemented as appropriate)  Flowsheets (Taken 1/12/2020 7547)  Plan of Care Reviewed With: patient  Outcome Summary: VSS family at bedside. maintain safety and continue to monitor

## 2020-01-12 NOTE — PLAN OF CARE
Problem: Patient Care Overview  Goal: Plan of Care Review  Outcome: Ongoing (interventions implemented as appropriate)  Flowsheets  Taken 1/12/2020 171  Progress: improving  Outcome Summary: Dr. Zapata in to see pt. Lasix 40 mg IV x1 given. Pt had adequate output with Lasix. Edema in BLE has decreased and pt states she is not as SOA with activity. Pt sched for CXR in am. EF 68%. Daughter at bedside with pt. Will continue to monitor. Safety maintained.  Taken 1/12/2020 1056  Plan of Care Reviewed With: patient;daughter

## 2020-01-13 ENCOUNTER — APPOINTMENT (OUTPATIENT)
Dept: GENERAL RADIOLOGY | Facility: HOSPITAL | Age: 75
End: 2020-01-13

## 2020-01-13 VITALS
DIASTOLIC BLOOD PRESSURE: 72 MMHG | HEART RATE: 51 BPM | BODY MASS INDEX: 21.22 KG/M2 | TEMPERATURE: 97.5 F | HEIGHT: 64 IN | WEIGHT: 124.3 LBS | RESPIRATION RATE: 20 BRPM | OXYGEN SATURATION: 96 % | SYSTOLIC BLOOD PRESSURE: 121 MMHG

## 2020-01-13 LAB
ANION GAP SERPL CALCULATED.3IONS-SCNC: 17.8 MMOL/L (ref 5–15)
BUN BLD-MCNC: 12 MG/DL (ref 8–23)
BUN/CREAT SERPL: 8.8 (ref 7–25)
CALCIUM SPEC-SCNC: 8.5 MG/DL (ref 8.6–10.5)
CHLORIDE SERPL-SCNC: 103 MMOL/L (ref 98–107)
CO2 SERPL-SCNC: 23.2 MMOL/L (ref 22–29)
CREAT BLD-MCNC: 1.36 MG/DL (ref 0.57–1)
DEPRECATED RDW RBC AUTO: 48.1 FL (ref 37–54)
ERYTHROCYTE [DISTWIDTH] IN BLOOD BY AUTOMATED COUNT: 15.5 % (ref 12.3–15.4)
GFR SERPL CREATININE-BSD FRML MDRD: 38 ML/MIN/1.73
GLUCOSE BLD-MCNC: 99 MG/DL (ref 65–99)
HCT VFR BLD AUTO: 30.5 % (ref 34–46.6)
HGB BLD-MCNC: 9.8 G/DL (ref 12–15.9)
MAGNESIUM SERPL-MCNC: 1.4 MG/DL (ref 1.6–2.4)
MCH RBC QN AUTO: 27.4 PG (ref 26.6–33)
MCHC RBC AUTO-ENTMCNC: 32.1 G/DL (ref 31.5–35.7)
MCV RBC AUTO: 85.2 FL (ref 79–97)
PLATELET # BLD AUTO: 223 10*3/MM3 (ref 140–450)
PMV BLD AUTO: 10.5 FL (ref 6–12)
POTASSIUM BLD-SCNC: 3.1 MMOL/L (ref 3.5–5.2)
RBC # BLD AUTO: 3.58 10*6/MM3 (ref 3.77–5.28)
SODIUM BLD-SCNC: 144 MMOL/L (ref 136–145)
WBC NRBC COR # BLD: 4.83 10*3/MM3 (ref 3.4–10.8)

## 2020-01-13 PROCEDURE — 25010000002 MAGNESIUM SULFATE IN D5W 1G/100ML (PREMIX) 1-5 GM/100ML-% SOLUTION: Performed by: NURSE PRACTITIONER

## 2020-01-13 PROCEDURE — 71046 X-RAY EXAM CHEST 2 VIEWS: CPT

## 2020-01-13 PROCEDURE — G0008 ADMIN INFLUENZA VIRUS VAC: HCPCS | Performed by: INTERNAL MEDICINE

## 2020-01-13 PROCEDURE — 94618 PULMONARY STRESS TESTING: CPT

## 2020-01-13 PROCEDURE — 85027 COMPLETE CBC AUTOMATED: CPT | Performed by: INTERNAL MEDICINE

## 2020-01-13 PROCEDURE — 83735 ASSAY OF MAGNESIUM: CPT | Performed by: INTERNAL MEDICINE

## 2020-01-13 PROCEDURE — 80048 BASIC METABOLIC PNL TOTAL CA: CPT | Performed by: INTERNAL MEDICINE

## 2020-01-13 PROCEDURE — 25010000002 INFLUENZA VAC SPLIT QUAD 0.5 ML SUSPENSION PREFILLED SYRINGE: Performed by: INTERNAL MEDICINE

## 2020-01-13 PROCEDURE — 90686 IIV4 VACC NO PRSV 0.5 ML IM: CPT | Performed by: INTERNAL MEDICINE

## 2020-01-13 PROCEDURE — 94799 UNLISTED PULMONARY SVC/PX: CPT

## 2020-01-13 PROCEDURE — 99238 HOSP IP/OBS DSCHRG MGMT 30/<: CPT | Performed by: NURSE PRACTITIONER

## 2020-01-13 RX ORDER — POTASSIUM CHLORIDE 1.5 G/1.77G
40 POWDER, FOR SOLUTION ORAL AS NEEDED
Status: DISCONTINUED | OUTPATIENT
Start: 2020-01-13 | End: 2020-01-13 | Stop reason: HOSPADM

## 2020-01-13 RX ORDER — HYDROXYCHLOROQUINE SULFATE 200 MG/1
200 TABLET, FILM COATED ORAL DAILY
COMMUNITY

## 2020-01-13 RX ORDER — MULTIVIT WITH MINERALS/LUTEIN
1000 TABLET ORAL DAILY
COMMUNITY

## 2020-01-13 RX ORDER — MELATONIN
1000 DAILY
COMMUNITY
End: 2020-03-12

## 2020-01-13 RX ORDER — AMLODIPINE BESYLATE 10 MG/1
10 TABLET ORAL DAILY
COMMUNITY
End: 2020-07-09 | Stop reason: SDUPTHER

## 2020-01-13 RX ORDER — MAGNESIUM SULFATE 1 G/100ML
1 INJECTION INTRAVENOUS AS NEEDED
Status: DISCONTINUED | OUTPATIENT
Start: 2020-01-13 | End: 2020-01-13 | Stop reason: HOSPADM

## 2020-01-13 RX ORDER — MAGNESIUM SULFATE HEPTAHYDRATE 40 MG/ML
2 INJECTION, SOLUTION INTRAVENOUS AS NEEDED
Status: DISCONTINUED | OUTPATIENT
Start: 2020-01-13 | End: 2020-01-13 | Stop reason: HOSPADM

## 2020-01-13 RX ORDER — POTASSIUM CHLORIDE 750 MG/1
40 CAPSULE, EXTENDED RELEASE ORAL AS NEEDED
Status: DISCONTINUED | OUTPATIENT
Start: 2020-01-13 | End: 2020-01-13 | Stop reason: HOSPADM

## 2020-01-13 RX ORDER — POTASSIUM CHLORIDE 7.45 MG/ML
10 INJECTION INTRAVENOUS
Status: DISCONTINUED | OUTPATIENT
Start: 2020-01-13 | End: 2020-01-13 | Stop reason: HOSPADM

## 2020-01-13 RX ORDER — MAGNESIUM SULFATE HEPTAHYDRATE 40 MG/ML
4 INJECTION, SOLUTION INTRAVENOUS AS NEEDED
Status: DISCONTINUED | OUTPATIENT
Start: 2020-01-13 | End: 2020-01-13 | Stop reason: HOSPADM

## 2020-01-13 RX ADMIN — POTASSIUM CHLORIDE 40 MEQ: 750 CAPSULE, EXTENDED RELEASE ORAL at 10:50

## 2020-01-13 RX ADMIN — HYDROXYCHLOROQUINE SULFATE 200 MG: 200 TABLET, FILM COATED ORAL at 09:41

## 2020-01-13 RX ADMIN — MAGNESIUM SULFATE 1 G: 1 INJECTION INTRAVENOUS at 12:07

## 2020-01-13 RX ADMIN — APIXABAN 5 MG: 5 TABLET, FILM COATED ORAL at 09:55

## 2020-01-13 RX ADMIN — ATENOLOL 25 MG: 25 TABLET ORAL at 09:41

## 2020-01-13 RX ADMIN — LOSARTAN POTASSIUM 100 MG: 50 TABLET, FILM COATED ORAL at 09:41

## 2020-01-13 RX ADMIN — MAGNESIUM SULFATE 1 G: 1 INJECTION INTRAVENOUS at 13:14

## 2020-01-13 RX ADMIN — SODIUM CHLORIDE, PRESERVATIVE FREE 10 ML: 5 INJECTION INTRAVENOUS at 09:41

## 2020-01-13 RX ADMIN — MAGNESIUM SULFATE 1 G: 1 INJECTION INTRAVENOUS at 10:51

## 2020-01-13 RX ADMIN — POTASSIUM CHLORIDE 40 MEQ: 750 CAPSULE, EXTENDED RELEASE ORAL at 15:25

## 2020-01-13 RX ADMIN — PANTOPRAZOLE SODIUM 40 MG: 40 TABLET, DELAYED RELEASE ORAL at 09:40

## 2020-01-13 RX ADMIN — ASPIRIN 81 MG: 81 TABLET, COATED ORAL at 09:41

## 2020-01-13 NOTE — PROGRESS NOTES
Case Management Discharge Note      Final Note: Home with Family                 Transportation Services  Private: Car    Final Discharge Disposition Code: 01 - home or self-care

## 2020-01-13 NOTE — NURSING NOTE
@ approximately 1010 spoke with Dr. Spencer regarding a patient medication not being reordered, Vortioxetine HBr 10mg tablet daily.  In conversation informed Dr. Spencer cardiology is okay w/patient being discharged.

## 2020-01-13 NOTE — PROGRESS NOTES
Discharge Planning Assessment  [facility]     Patient Name: Radha Alfred  MRN: [id number]  Today's Date: 1/13/2020    Admit Date: 1/11/2020    Discharge Needs Assessment     Row Name 01/13/20 1031       Living Environment    Lives With  spouse    Name(s) of Who Lives With Patient   Juanito Alfred    Current Living Arrangements  home/apartment/condo    Primary Care Provided by  self    Provides Primary Care For  spouse    Caregiving Concerns   with hospice at home right now    Family Caregiver if Needed  child(moni), adult    Family Caregiver Names  daughter Edel Rajan    Quality of Family Relationships  involved;supportive    Able to Return to Prior Arrangements  yes       Resource/Environmental Concerns    Resource/Environmental Concerns  none    Transportation Concerns  car, none       Transition Planning    Patient/Family Anticipates Transition to  home with family    Patient/Family Anticipated Services at Transition  none    Transportation Anticipated  family or friend will provide       Discharge Needs Assessment    Readmission Within the Last 30 Days  no previous admission in last 30 days    Concerns to be Addressed  no discharge needs identified;denies needs/concerns at this time    Equipment Currently Used at Home  bath bench    Anticipated Changes Related to Illness  none    Equipment Needed After Discharge  none        Discharge Plan     Row Name 01/13/20 1032       Plan    Plan  Plan is home with family    Patient/Family in Agreement with Plan  yes    Plan Comments  Facesheet verified.  Patientl lives at home with her spouse who is currently with hospice.  She denies using any equipment at home.  She is independent with ADLs and denies any discharge needs.  Her daughter is at bedside and can transport at discharge.  [facility] will continue to follow.                  Demographic Summary     Row Name 01/13/20 1029       General Information    Admission Type  inpatient    Arrived From  home     Reason for Consult  discharge planning    Preferred Language  English     Used During This Interaction  no       Contact Information    Permission Granted to Share Info With  family/designee    Contact Information Comments  daughter Edel Rajan        Functional Status     Row Name 01/13/20 1030       Functional Status    Usual Activity Tolerance  good    Current Activity Tolerance  good       Functional Status, IADL    Medications  independent    Meal Preparation  independent    Housekeeping  independent    Laundry  independent    Shopping  independent       Mental Status    General Appearance WDL  WDL       Mental Status Summary    Recent Changes in Mental Status/Cognitive Functioning  no changes        Psychosocial     Row Name 01/13/20 1030       Values/Beliefs    Spiritual, Cultural Beliefs, Church Practices, Values that Affect Care  no       Behavior WDL    Behavior WDL  WDL       Emotion Mood WDL    Emotion/Mood/Affect WDL  WDL       Speech WDL    Speech WDL  WDL       Perceptual State WDL    Perceptual State WDL  WDL       Thought Process WDL    Thought Process WDL  WDL       Intellectual Performance WDL    Intellectual Performance WDL  WDL       Coping/Stress    Major Change/Loss/Stressor  death of a loved one    Patient Personal Strengths  able to adapt;strong support system    Sources of Support  adult child(moni);other family members    Reaction to Health Status  accepting;adjusting    Understanding of Condition and Treatment  adequate understanding of medical condition       Developmental Stage (Eriksson's)    Developmental Stage  Stage 8 (65 years-death/Late Adulthood) Integrity vs. Despair        Abuse/Neglect     Row Name 01/13/20 1031       Personal Safety    Feels Unsafe at Home or Work/School  no    Feels Threatened by Someone  no    Does Anyone Try to Keep You From Having Contact with Others or Doing Things Outside Your Home?  no    Physical Signs of Abuse Present  no                 Shannon Epley, RN

## 2020-01-13 NOTE — PROGRESS NOTES
"          Patient Name: Radha Alfred  :1945  74 y.o.  LOS: 2  Encounter Provider: DIANA Nixon      Patient Care Team:  Fermin Pittman MD as PCP - General (Internal Medicine)    Chief Complaint: Follow-up shortness of breath    Interval History: Patient currently being diuresed, decision regarding work-up for possible amiodarone toxicity pending.  TSH elevated at 27.       Objective   Vital Signs  Temp:  [97.4 °F (36.3 °C)-97.6 °F (36.4 °C)] 97.5 °F (36.4 °C)  Heart Rate:  [53-61] 61  Resp:  [20] 20  BP: (141-153)/(65-69) 150/65    Intake/Output Summary (Last 24 hours) at 2020 0915  Last data filed at 2020 0721  Gross per 24 hour   Intake --   Output 3250 ml   Net -3250 ml     Flowsheet Rows      First Filed Value   Admission Height  162.6 cm (64\") Documented at 2020 0350   Admission Weight  61.2 kg (135 lb) Documented at 2020 0350            Physical Exam   Constitutional: She is oriented to person, place, and time. She appears well-developed and well-nourished. No distress.   HENT:   Head: Normocephalic and atraumatic.   Eyes: Pupils are equal, round, and reactive to light. Conjunctivae and EOM are normal.   Neck: Normal range of motion. Neck supple. No JVD present. No tracheal deviation present. No thyromegaly present.   Cardiovascular: Normal rate, regular rhythm, normal heart sounds and intact distal pulses. Exam reveals no gallop and no friction rub.   No murmur heard.  Pulmonary/Chest: Effort normal. No respiratory distress. She has decreased breath sounds in the right lower field and the left lower field. She has no wheezes. She has no rales. She exhibits no tenderness.   Abdominal: Soft. Bowel sounds are normal. She exhibits no distension. There is no tenderness.   Musculoskeletal: Normal range of motion. She exhibits no edema, tenderness or deformity.   Neurological: She is alert and oriented to person, place, and time.   Skin: Skin is warm and dry. No rash " noted. She is not diaphoretic. No erythema. No pallor.   Psychiatric: She has a normal mood and affect. Her behavior is normal.       Results Review:    Results from last 7 days   Lab Units 01/13/20  0433   SODIUM mmol/L 144   POTASSIUM mmol/L 3.1*   CHLORIDE mmol/L 103   CO2 mmol/L 23.2   BUN mg/dL 12   CREATININE mg/dL 1.36*   GLUCOSE mg/dL 99   CALCIUM mg/dL 8.5*         Results from last 7 days   Lab Units 01/13/20  0433   WBC 10*3/mm3 4.83   HEMOGLOBIN g/dL 9.8*   HEMATOCRIT % 30.5*   PLATELETS 10*3/mm3 223         Results from last 7 days   Lab Units 01/13/20  0433   MAGNESIUM mg/dL 1.4*             I have reviewed the patient's recent medical history and current medications, as well as personally reviewed/interpreted the ECG/telemetry data        Medication Review:     amLODIPine 10 mg Oral Q24H   apixaban 5 mg Oral Q12H   aspirin 81 mg Oral Daily   atenolol 25 mg Oral Daily   atorvastatin 40 mg Oral Nightly   hydroxychloroquine 200 mg Oral Daily   influenza vaccine 0.5 mL Intramuscular Once   losartan 100 mg Oral Q24H   melatonin 5 mg Oral Nightly   pantoprazole 40 mg Oral Daily   QUEtiapine 50 mg Oral Nightly   sodium chloride 10 mL Intravenous Q12H      Estimated Creatinine Clearance: 31.8 mL/min (A) (by C-G formula based on SCr of 1.36 mg/dL (H)).    Assessment/Plan   1. New onset diastolic CHF- diuresing well, net KITTY -1850 last 24 hours, weight decreased 11 pounds this admission if accurate.  Has received 2 doses of IV Lasix, now appears euvolemic on exam.  2. Paroxysmal atrial fibrillation-currently sinus rhythm on atenolol, anticoagulated on apixaban.  Continue medications.  Amiodarone toxicity not ruled out yet, plan to be determined per clinical course   3. Possible amiodarone toxicity  4. Shortness of breath-pulmonary following, attention to patient by all consulting providers appreciated.  Has been diuresed effectively, LLE edema resolved and shortness of breath significantly improved.  CXR  reveals mild vascular congestion by imaging.  5. Hypokalemia-potassium 3.1 today, will replace.  6. Hypothyroidism-TSH 27, appreciate internal medicine attention to patient.  As noted per IM note anticipate TSH dropping now that she is off of the amiodarone.  This will need to be monitored outpatient by PCP and treated accordingly.  7. Valvular heart disease-noted per echo, will monitor with repeat echoes outpatient  8. CKD- creatinine 1.36, continue to monitor closely while being diuresed.  9. Hyperlipidemia-on statin therapy, AST/ALT within normal limits.  LDL well controlled per last lipid panel 11/15/2019, continue to monitor outpatient.  10. Hypertension-blood pressure on the higher end of normal today, has been labile running 120-153/55-69.  Continue to monitor, blood pressure begins to average 150 systolically will increase atenolol.  11. DM 2- appreciate internal medicine attention to patient.  12. Hypomagnesemia-mag level 1.4 today, will replace.  13. Anemia-hemoglobin 9.8, continue to monitor.    Given the nature of the 's illness (he went into hospice on Friday and she is concerned that he will pass away before she makes at home), and the improvement in her symptomatology we would like to send her home as soon as she is able.  Will replace electrolytes, discharge planning pending pulmonary and internal medicine input today.  If discharged today would have her repeat BMP and mag in 1 week with PCP, and follow-up closely outpatient with cardiology with appointment in 2 weeks.  TSH to be rechecked within 2 to 4 weeks with PCP outpatient    DIANA Nixon  [facility]  01/13/20  9:15 AM

## 2020-01-13 NOTE — DISCHARGE SUMMARY
[facility]  Physician Discharge Summary    Patient Identification:  Name: Radha Alfred  Age: 74 y.o.  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)   • Type 2 diabetes mellitus (CMS/HCC)   • Encounter for screening for diabetes mellitus   • Hypertension   • Disorder of foot   • History of cardiac catheterization   • History of surgical procedure   • Displacement of lumbar intervertebral disc without myelopathy   • Heparin-induced thrombocytopenia (CMS/HCC)   • Peripheral vascular disease (CMS/HCC)   • Spinal stenosis   • Urinary tract bacterial infections   • Small bowel obstruction (CMS/HCC)   • History of cholecystectomy   • Postprocedural state   • Rheumatoid arthritis (CMS/HCC)   • Incisional hernia   • Pain   • Need for vaccination   • Polypharmacy   • Disorder of lipid metabolism   • Memory loss   • Mixed hyperlipidemia   • Anticoagulated   • Paroxysmal atrial fibrillation (CMS/HCC)   • Atrial fibrillation (CMS/HCC)   • Bradycardia, drug induced   • New onset of congestive heart failure (CMS/HCC)   • Chronic kidney disease, stage 3 (CMS/HCC)   • Hypothyroidism due to medication       Discharged Condition: stable    Hospital Course: This 74-year-old female was admitted  directly after being seen in the emergency department at the [facility].  She had recently been seen outpatient by myself and amiodarone was discontinued given her ongoing  fatigue, shortness of breath, and just generally feeling poor.  History to include paroxysmal atrial fibrillation for which she had been cardioverted in early November-amiodarone therapy was initiated at that time, prior CVA, DM 2, hyperlipidemia, hypertension, IBS, MVP, now new onset of CHF, PVD, and prior DVT.  She is anticoagulated on apixaban.  At prior office visit amiodarone had been discontinued and metoprolol was switched to atenolol.  The patient has remained sinus rhythm throughout admission.  She was found to have TSH of 27, internal medicine was consulted.  Given her shortness of breath pulmonary was consulted as well.  Appreciate all services attention to patient.  As it is suspected that she may have amiodarone toxicity no Synthroid was started at this time.  Blood pressure is stable and controlled at discharge at 121/72.  As she was found to have a BNP of 4306 she received 2 doses of IV Lasix, she is now euvolemic on exam.  Creatinine stable and tolerated well, 1.36 at discharge.  Will refrain from starting diuretics outpatient.  Potassium and magnesium low after diuresis, replaced.  She is to follow-up with PCP within 1 week to recheck these electrolytes.  Plan is for her to follow-up with repeat TSH within 30 days as the half-life of amiodarone is quite long.  Full follow-up directions as below.  Repeat echo done this admission reveals normal EF of 60%, CYDNEY 1.9 cm², moderate MR, mild TR, RVSP from TR 40 mmHg.  Chest x-ray suggestive of mild CHF, consistent with BNP.  As the patient is taking care of her  who was recently admitted to hospice care within the home Friday, she is very anxious to get home.  We will have her follow closely with her electrolytes and fluid status outpatient, will start p.o. diuretics should this be needed.  She is discharged home in stable condition with no complaints of chest pain, shortness of breath, dizziness, weakness, syncope, near syncope, or  edema.    Consults:   IP CONSULT TO NUTRITION SERVICES  IP CONSULT TO PULMONOLOGY  IP CONSULT TO INTERNAL MEDICINE    Discharge Exam:  General: No acute distress; resting comfortably in bed with family at bedside   Skin: Warm and dry, no diaphoresis noted   EYES: PERTL   HEENT: external ear and nose normal; oral mucosa moist   Neck: Supple; no JVD   Heart: S1S2 regular rate and rhythm; no murmurs; no gallop or rub appreciated   Pulmonary: Respirations regular, unlabored at rest, bilateral breath sounds have good air entry anteriorly and laterally; no crackles, rubs or wheezes auscultated.     GI: Soft, non-tender, non-distended, positive bowel sounds     Extremities: Bilateral lower extremities have no pre-tibial pitting edema; DP/PT pulses are palpable   Neurological: Alert and oriented x 3; no neuro deficits          LABS:      Results from last 7 days   Lab Units 01/13/20  0433   MAGNESIUM mg/dL 1.4*     Results from last 7 days   Lab Units 01/13/20  0433   SODIUM mmol/L 144   POTASSIUM mmol/L 3.1*   BUN mg/dL 12   CREATININE mg/dL 1.36*   CALCIUM mg/dL 8.5*       Results from last 7 days   Lab Units 01/13/20  0433 01/12/20  0333 01/11/20  0908   WBC 10*3/mm3 4.83 5.36 5.65   HEMOGLOBIN g/dL 9.8* 9.7* 9.7*   HEMATOCRIT % 30.5* 30.6* 30.4*   PLATELETS 10*3/mm3 223 216 230             Disposition:  Home    Discharge Medications:      Discharge Medications      Continue These Medications      Instructions Start Date   amLODIPine 10 MG tablet  Commonly known as:  NORVASC   10 mg, Oral, Daily      ascorbic acid 1000 MG tablet  Commonly known as:  VITAMIN C   1,000 mg, Oral, Daily      aspirin 81 MG EC tablet   81 mg, Oral, Daily      atenolol 25 MG tablet  Commonly known as:  TENORMIN   25 mg, Oral, Daily      atorvastatin 40 MG tablet  Commonly known as:  LIPITOR   TAKE ONE TABLET BY MOUTH EVERY NIGHT AT BEDTIME      cholecalciferol 25 MCG (1000 UT) tablet  Commonly known as:  VITAMIN D3   1,000 Units, Oral, Daily     "  ELIQUIS 5 MG tablet tablet  Generic drug:  apixaban   5 mg, Oral, 2 Times Daily      hydroxychloroquine 200 MG tablet  Commonly known as:  PLAQUENIL   200 mg, Oral, Daily      loperamide 2 MG tablet  Commonly known as:  IMODIUM A-D   2 mg, Oral, 4 Times Daily PRN      losartan 100 MG tablet  Commonly known as:  COZAAR   100 mg, Oral, Every 24 Hours Scheduled      melatonin 5 MG tablet tablet   5 mg, Oral, Nightly      PROTONIX 40 MG EC tablet  Generic drug:  pantoprazole   40 mg, Oral, Daily      QUEtiapine 50 MG tablet  Commonly known as:  SEROquel   50 mg, Oral, Nightly      Vortioxetine HBr 10 MG tablet   10 mg, Oral, Daily           Estimated Creatinine Clearance: 31.8 mL/min (A) (by C-G formula based on SCr of 1.36 mg/dL (H)).    Discharge Home Instructions:     Discharge Follow-up with PCP    Currently Documented PCP:   Fermin Pittman MD   PCP Phone Number:   [phone]   Discharge Follow-up with Specified Provider: Follow-up on February 13 with Dr. Burnett with lab work    Discharge Follow-up with Specified Provider: Follow-up with Dr. Burnett's nurse practitioner, DIANA Pearson, on January 28 at 3:15 PM    Discharge Instructions    1.  Follow-up with PCP with lab work (BMP) within 1 week of discharge.  2.  Follow-up with Dr. Burnett's nurse practitioner, DIANA Pearson, on January 28 at 3:15 PM  3.  Follow-up with Dr. Burnett on February 13 with lab work (TSH).  4.  Continue home medications.  5.  Do not take amiodarone.  6.  If you experience any chest pain, shortness of breath, dizziness, weakness, swelling, or pass out please go immediately to the emergency department.      Signed:  DIANA Nixon  1/13/2020  1:58 PM      EMR Dragon/Transcription:   \"Dictated utilizing Dragon dictation\".   "

## 2020-01-13 NOTE — PROGRESS NOTES
Name: Radha Alfred ADMIT: 2020   : 1945  PCP: Fermin Pittman MD    MRN: [id number] LOS: 2 days   AGE/SEX: 74 y.o. female  ROOM: [facility]     Subjective   Subjective   Feels much better today.  No new complaints.  Wants to go home.    Review of Systems   Respiratory: Negative for shortness of breath.    Cardiovascular: Negative for chest pain.        Objective   Objective   Vital Signs  Temp:  [97.4 °F (36.3 °C)-97.6 °F (36.4 °C)] 97.5 °F (36.4 °C)  Heart Rate:  [53-61] 55  Resp:  [20] 20  BP: (141-153)/(65-69) 150/65  SpO2:  [90 %-99 %] 93 %  on  Flow (L/min):  [2-3] 3;   Device (Oxygen Therapy): room air  Body mass index is 21.34 kg/m².  Physical Exam   Constitutional: She is oriented to person, place, and time. No distress.   Cardiovascular: Normal rate and regular rhythm.   No murmur heard.  Pulmonary/Chest: Effort normal and breath sounds normal.   Abdominal: Soft. Bowel sounds are normal. She exhibits no distension. There is no tenderness.   Musculoskeletal: Normal range of motion. She exhibits no edema.   Neurological: She is alert and oriented to person, place, and time.   Skin: Skin is warm and dry. She is not diaphoretic.       Results Review:       I reviewed the patient's new clinical results.  Results from last 7 days   Lab Units 20  0433 20  0333 20  0908   WBC 10*3/mm3 4.83 5.36 5.65   HEMOGLOBIN g/dL 9.8* 9.7* 9.7*   PLATELETS 10*3/mm3 223 216 230     Results from last 7 days   Lab Units 20  0433 20  0333 20  0908   SODIUM mmol/L 144 144 144   POTASSIUM mmol/L 3.1* 3.7 3.6   CHLORIDE mmol/L 103 108* 107   CO2 mmol/L 23.2 21.1* 21.2*   BUN mg/dL 12 14 16   CREATININE mg/dL 1.36* 1.41* 1.44*   GLUCOSE mg/dL 99 94 94   Estimated Creatinine Clearance: 31.8 mL/min (A) (by C-G formula based on SCr of 1.36 mg/dL (H)).  Results from last 7 days   Lab Units 20  0908   ALBUMIN g/dL 3.90   BILIRUBIN mg/dL 0.7   ALK PHOS U/L 60   AST (SGOT) U/L 18      ALT (SGPT) U/L 11     Results from last 7 days   Lab Units 01/13/20  0433 01/12/20  0333 01/11/20  0908   CALCIUM mg/dL 8.5* 8.3* 8.5*   ALBUMIN g/dL  --   --  3.90   MAGNESIUM mg/dL 1.4*  --   --        No results found for: HGBA1C, POCGLU      amLODIPine 10 mg Oral Q24H   apixaban 5 mg Oral Q12H   aspirin 81 mg Oral Daily   atenolol 25 mg Oral Daily   atorvastatin 40 mg Oral Nightly   hydroxychloroquine 200 mg Oral Daily   influenza vaccine 0.5 mL Intramuscular Once   losartan 100 mg Oral Q24H   melatonin 5 mg Oral Nightly   pantoprazole 40 mg Oral Daily   QUEtiapine 50 mg Oral Nightly   sodium chloride 10 mL Intravenous Q12H      Diet Regular; Cardiac, Consistent Carbohydrate       Assessment/Plan     Active Hospital Problems    Diagnosis  POA   • **New onset of congestive heart failure (CMS/HCC) [I50.9]  Yes   • Chronic kidney disease, stage 3 (CMS/HCC) [N18.3]  Yes   • Hypothyroidism due to medication [E03.2]  Yes   • Paroxysmal atrial fibrillation (CMS/HCC) [I48.0]  Yes   • Type 2 diabetes mellitus (CMS/HCC) [E11.9]  Yes   • Hypertension [I10]  Yes   • Anemia [D64.9]  Yes      Resolved Hospital Problems   No resolved problems to display.       74 y.o. female admitted with New onset of congestive heart failure (CMS/HCC).    · Free T4 normal.  Amiodarone being discontinued.  Expect TSH to improve.  Advised that she have this retested with her PCP within the next few weeks.  Would not start on thyroid replacement therapy at this point.  · Creatinine stable and likely at baseline.  · No objection to discharge.      Morgan Spencer MD  [facility]  01/13/20  1:07 PM

## 2020-01-13 NOTE — PLAN OF CARE
Problem: Patient Care Overview  Goal: Plan of Care Review  Outcome: Ongoing (interventions implemented as appropriate)  Flowsheets  Taken 1/13/2020 6129  Progress: improving  Outcome Summary: pt vss and afebrile during shift.  no acute issues noted.  pt take off o2 and saturated between 94-97% consistently for remainder o shift.  pt passed walking oximetry and goes home with no o2.  pt safety maintained.  no actue issues.  Taken 1/13/2020 1445  Plan of Care Reviewed With: patient;grandchild(moni)

## 2020-01-13 NOTE — PROGRESS NOTES
Clinical Pharmacy Services: Medication History    Radha Alfred is a 74 y.o. female presenting to [facility] for New onset of congestive heart failure (CMS/HCC) [I50.9]    She  has a past medical history of Anxiety, Arthritis, Atrial fibrillation (CMS/HCC), Cerebral infarction (CMS/HCC), Chest pain, Diabetes mellitus (CMS/HCC), Edema of lower extremity, Elevated cholesterol, GERD (gastroesophageal reflux disease), Hyperlipidemia, Hypertension, Hypothyroidism due to medication (1/12/2020), IBS (irritable bowel syndrome), MVP (mitral valve prolapse), New onset of congestive heart failure (CMS/HCC) (1/11/2020), PVD (peripheral vascular disease) (CMS/[facility]), Stroke (CMS/[facility]), and Venous thromboembolism.    Allergies as of 01/11/2020 - Reviewed 01/11/2020   Allergen Reaction Noted    Amoxicillin Diarrhea 04/17/2018    Contrast dye Other (See Comments) 04/21/2016    Heparin Other (See Comments) 04/21/2016    Iodinated diagnostic agents Other (See Comments) 04/08/2013    Erythromycin Diarrhea 04/21/2016    Meperidine GI Intolerance 04/21/2016    Sulfa antibiotics Diarrhea 04/21/2016    Baclofen Other (See Comments) 05/20/2019       Medication information was obtained from: patient  Pharmacy and Phone Number: [facility] [facility] [address]  - [phone] [facility] [phone] FX    Prior to Admission Medications       Prescriptions Last Dose Informant Patient Reported? Taking?    amLODIPine (NORVASC) 10 MG tablet  Self Yes Yes    Take 10 mg by mouth Daily.    ascorbic acid (VITAMIN C) 1000 MG tablet  Self Yes Yes    Take 1,000 mg by mouth Daily.    aspirin 81 MG EC tablet  Self Yes Yes    Take 81 mg by mouth Daily.    atenolol (TENORMIN) 25 MG tablet 1/10/2020 Self No Yes    Take 1 tablet by mouth Daily.    atorvastatin (LIPITOR) 40 MG tablet 1/10/2020 Self Yes Yes    TAKE ONE TABLET BY MOUTH EVERY NIGHT AT BEDTIME    cholecalciferol (VITAMIN D3) 25 MCG (1000  UT) tablet  Self Yes Yes    Take 1,000 Units by mouth Daily.    ELIQUIS 5 MG tablet tablet 1/10/2020 Self Yes Yes    Take 5 mg by mouth 2 (Two) Times a Day.    hydroxychloroquine (PLAQUENIL) 200 MG tablet  Self Yes Yes    Take 200 mg by mouth Daily.    loperamide (IMODIUM A-D) 2 MG tablet 1/10/2020 Self Yes Yes    Take 2 mg by mouth 4 (Four) Times a Day As Needed for Diarrhea.    losartan (COZAAR) 100 MG tablet 1/10/2020 Self No Yes    Take 1 tablet by mouth Daily.    melatonin 5 MG tablet tablet Past Week Self Yes Yes    Take 5 mg by mouth Every Night.    pantoprazole (PROTONIX) 40 MG EC tablet 1/10/2020 Self Yes Yes    Take 40 mg by mouth Daily.    QUEtiapine (SEROquel) 50 MG tablet 1/10/2020 Self Yes Yes    Take 50 mg by mouth Every Night.    Vortioxetine HBr 10 MG tablet 1/10/2020 Self Yes Yes    Take 10 mg by mouth Daily.                Medication notes: Based on the information obtained from patient, following changes have been made to patient's PTA medication list.     Imodium and Plaquenil dosing frequency updated, duplicate seroquel order removed, added vitamin C and vitamin D to home med list.     This medication list is complete to the best of my knowledge as of 1/13/2020    Please call pharmacy for any questions.     Nini Camp, PharmD, BCPS  01/13/20 12:01 PM

## 2020-01-13 NOTE — THERAPY EVALUATION
Exercise Oximetry    Patient Name:Radha Alfred   MRN: [id number]   Date: 01/13/20             ROOM AIR BASELINE   SpO2%     97   Heart Rate    78   Blood Pressure      EXERCISE ON ROOM AIR SpO2% EXERCISE ON O2 @  LPM SpO2%   1 MINUTE      96 1 MINUTE    2 MINUTES      96  2 MINUTES    3 MINUTES      95 3 MINUTES    4 MINUTES      94 4 MINUTES    5 MINUTES     92 5 MINUTES    6 MINUTES      92 6 MINUTES               Distance Walked   Distance Walked   Dyspnea (Franci Scale)   Dyspnea (Franci Scale)   Fatigue (Franci Scale)   Fatigue (Franci Scale)   SpO2% Post Exercise   SpO2% Post Exercise   HR Post Exercise   HR Post Exercise   Time to Recovery   Time to Recovery     Comments:     Patient walked very well in the halls with me no SOA noted. She said she felt weak from not being up a lot but said she felt fine. No de sats noted.     faith padgett RRT

## 2020-01-13 NOTE — PROGRESS NOTES
[facility]  [phone]  Elton Cespedes MD    Subjective:  LOS: 2    Denies complaints today. Her leg edema is nearly resolved. Denies soa, cough or wheezing.    Objective   Vital Signs past 24hrs    Temp range: Temp (24hrs), Av.5 °F (36.4 °C), Min:97.4 °F (36.3 °C), Max:97.6 °F (36.4 °C)    BP range: BP: (141-153)/(65-69) 150/65  Pulse range: Heart Rate:  [53-61] 55  Resp rate range: Resp:  [20] 20    Device (Oxygen Therapy): room airFlow (L/min):  [2-3] 3  Oxygen range:SpO2:  [90 %-99 %] 93 %      56.4 kg (124 lb 4.8 oz); Body mass index is 21.34 kg/m².    Intake/Output Summary (Last 24 hours) at 2020 1307  Last data filed at 2020 1115  Gross per 24 hour   Intake 480 ml   Output 3250 ml   Net -2770 ml       Physical Exam   Constitutional: She appears well-developed.   HENT:   Head: Normocephalic.   Eyes: Pupils are equal, round, and reactive to light.   Cardiovascular: Normal rate and regular rhythm.   No murmur heard.  Pulmonary/Chest: Effort normal. No respiratory distress. She has decreased breath sounds. She has no wheezes. She has no rales.   Abdominal: Soft. Bowel sounds are normal. She exhibits no mass. There is no tenderness.   Musculoskeletal: She exhibits no edema.   Skin: No rash noted.     Results Review:    I have reviewed the laboratory and imaging data since the last note by [facility] physician.  My annotations are noted in assessment and plan.    Medication Review:  I have reviewed the current MAR.  My annotations are noted in assessment and plan.       Plan   PCCM Problems  Dyspnea with acute respiratory failure, hypoxia  Acute diastolic congestive heart failure  Recent amiodarone treatment  New diagnosis hypothyroidism  Acute kidney injury  A. fib on anticoagulation status post cardioversion  History CVA  Ex-smoker with no formal diagnosis COPD  Anemia      Plan of Treatment  Diuretics as per cardiology service.  Chest x-ray continues to show evidence chf and no evidence for  amiodarone lung toxicity.    Ex-smoker and likely underlying COPD.  Requires outpatient PFTs.    Now on RA. No desat with walk ox.    No objections to discharge per pulmonary.    Elton Cespedes MD  01/13/20  1:07 PM    Part of this note may be an electronic transcription/translation of spoken language to printed text using the Dragon Dictation System.

## 2020-01-13 NOTE — PLAN OF CARE
Problem: Patient Care Overview  Goal: Plan of Care Review  Outcome: Ongoing (interventions implemented as appropriate)  Flowsheets  Taken 1/12/2020 1719 by Chloe Fajardo RN  Progress: improving  Taken 1/13/2020 0359 by Catie Lozano RN  Plan of Care Reviewed With: patient  Goal: Individualization and Mutuality  Outcome: Ongoing (interventions implemented as appropriate)  Goal: Discharge Needs Assessment  Outcome: Ongoing (interventions implemented as appropriate)  Goal: Interprofessional Rounds/Family Conf  Outcome: Ongoing (interventions implemented as appropriate)     Problem: Cardiac: Heart Failure (Adult)  Goal: Signs and Symptoms of Listed Potential Problems Will be Absent, Minimized or Managed (Cardiac: Heart Failure)  Outcome: Ongoing (interventions implemented as appropriate)     Problem: Fall Risk (Adult)  Goal: Identify Related Risk Factors and Signs and Symptoms  Outcome: Ongoing (interventions implemented as appropriate)  Goal: Absence of Fall  Outcome: Ongoing (interventions implemented as appropriate)     Problem: Skin Injury Risk (Adult)  Goal: Identify Related Risk Factors and Signs and Symptoms  Outcome: Ongoing (interventions implemented as appropriate)  Goal: Skin Health and Integrity  Outcome: Ongoing (interventions implemented as appropriate)

## 2020-01-14 ENCOUNTER — TELEPHONE (OUTPATIENT)
Dept: CARDIOLOGY | Facility: CLINIC | Age: 75
End: 2020-01-14

## 2020-01-14 ENCOUNTER — READMISSION MANAGEMENT (OUTPATIENT)
Dept: CALL CENTER | Facility: HOSPITAL | Age: 75
End: 2020-01-14

## 2020-01-14 LAB
FT4I SERPL CALC-MCNC: 2.2 (ref 1.2–4.9)
T3 SERPL-MCNC: 69 NG/DL (ref 71–180)
T3RU NFR SERPL: 24 % (ref 24–39)
T4 SERPL-MCNC: 9.1 UG/DL (ref 4.5–12)
TSH SERPL-ACNC: 22.76 UIU/ML (ref 0.45–4.5)

## 2020-01-14 NOTE — TELEPHONE ENCOUNTER
----- Message from Shauna Torres sent at 1/14/2020 10:45 AM EST -----  Regarding: RX  Contact: [phone]  PT SAYS THAT LILLIAN GAVE HER VALIUM IN HOSPITAL AND WOULD LIKE A RX FOR IT NOW???      S/w pt informed her to contact pcp

## 2020-01-14 NOTE — OUTREACH NOTE
Prep Survey      Responses   Facility patient discharged from?  [facility]   Is patient eligible?  Yes   Discharge diagnosis  SOA,  hypothyroidism,  paroxysmal atrial fibrillation   Does the patient have one of the following disease processes/diagnoses(primary or secondary)?  Other   Does the patient have Home health ordered?  No   Is there a DME ordered?  No   Prep survey completed?  Yes          Estee Brooks RN

## 2020-01-16 ENCOUNTER — READMISSION MANAGEMENT (OUTPATIENT)
Dept: CALL CENTER | Facility: HOSPITAL | Age: 75
End: 2020-01-16

## 2020-01-16 NOTE — OUTREACH NOTE
Medical Week 1 Survey      Responses   Facility patient discharged from?  [facility]   Does the patient have one of the following disease processes/diagnoses(primary or secondary)?  Other   Is there a successful TCM telephone encounter documented?  No   Week 1 attempt successful?  No   Unsuccessful attempts  Attempt 1          Radha Orosco RN

## 2020-01-18 ENCOUNTER — READMISSION MANAGEMENT (OUTPATIENT)
Dept: CALL CENTER | Facility: HOSPITAL | Age: 75
End: 2020-01-18

## 2020-01-18 NOTE — OUTREACH NOTE
Medical Week 1 Survey      Responses   Facility patient discharged from?  [place]   Does the patient have one of the following disease processes/diagnoses(primary or secondary)?  Other   Is there a successful TCM telephone encounter documented?  No   Week 1 attempt successful?  Yes   Call start time  1503   Revoke  Decline to participate   Call end time  1500          Audrey Ruano, RN

## 2020-01-28 ENCOUNTER — OFFICE VISIT (OUTPATIENT)
Dept: CARDIOLOGY | Facility: CLINIC | Age: 75
End: 2020-01-28

## 2020-01-28 VITALS
HEART RATE: 54 BPM | HEIGHT: 64 IN | SYSTOLIC BLOOD PRESSURE: 117 MMHG | BODY MASS INDEX: 21.85 KG/M2 | DIASTOLIC BLOOD PRESSURE: 56 MMHG | WEIGHT: 128 LBS

## 2020-01-28 DIAGNOSIS — E78.2 MIXED HYPERLIPIDEMIA: ICD-10-CM

## 2020-01-28 DIAGNOSIS — E03.2 HYPOTHYROIDISM DUE TO MEDICATION: ICD-10-CM

## 2020-01-28 DIAGNOSIS — T46.2X1D AMIODARONE TOXICITY, ACCIDENTAL OR UNINTENTIONAL, SUBSEQUENT ENCOUNTER: ICD-10-CM

## 2020-01-28 DIAGNOSIS — I48.0 PAROXYSMAL ATRIAL FIBRILLATION (HCC): Primary | ICD-10-CM

## 2020-01-28 PROBLEM — T46.2X1A AMIODARONE TOXICITY: Status: ACTIVE | Noted: 2020-01-28

## 2020-01-28 PROCEDURE — 93000 ELECTROCARDIOGRAM COMPLETE: CPT | Performed by: NURSE PRACTITIONER

## 2020-01-28 PROCEDURE — 99214 OFFICE O/P EST MOD 30 MIN: CPT | Performed by: NURSE PRACTITIONER

## 2020-01-28 RX ORDER — CITALOPRAM 20 MG/1
20 TABLET ORAL DAILY
COMMUNITY
Start: 2020-01-20 | End: 2021-01-19

## 2020-01-28 RX ORDER — DIAZEPAM 5 MG/1
5 TABLET ORAL EVERY 6 HOURS PRN
COMMUNITY

## 2020-01-28 NOTE — PROGRESS NOTES
Patient Name: Radha Alfred  :1945  Age: 74 y.o.  Primary Cardiologist: Kiran Burnett MD  Encounter Provider:  DIANA Nixon      Chief Complaint:   Chief Complaint   Patient presents with   • Atrial Fibrillation         HPI this 74-year-old female, known to this provider, comes today after recent admission regarding amiodarone toxicity.  TSH was found to be very elevated during that admission, amiodarone was discontinued.  Current diagnoses to include cerebral infarction, DM 2, GERD, hyperlipidemia, hypertension, mitral valve prolapse, PVD, and prior CVA.  Echo 2019 reveals mild MR/TR, moderate dilation of LAC, EF 64% with no evidence of pericardial effusion.  Stress testing done at that time revealed no evidence of myocardial ischemia.  Lipid panel November 15, 2019 reveals triglycerides 101  HDL 54 and LDL 30, AST/ALT within normal limits at that time.  She is anticoagulated on apixaban and on beta-blockade.  ECG in office today reveals sinus rhythm, rate 54.  She is following up with PCP regarding TSH.  Echo done 2020 revealed EF 60%, mild concentric LVH, calcification for aortic valve noted, moderate MR, mild TR with RVSP 40 mmHg.  Lipid panel 2020 revealed triglycerides 80  HDL 49 and LDL 56 with AST/ALT within normal limits at that time.  Creatinine was elevated at 1.8.  Repeat TSH done 2020 is 32.27.  This is further elevated from TSH in hospital.      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)   • Type 2 diabetes mellitus (CMS/HCC)   • Encounter for screening for diabetes mellitus   • Hypertension   • Disorder of foot   • History of cardiac catheterization   • History of surgical procedure    • Displacement of lumbar intervertebral disc without myelopathy   • Heparin-induced thrombocytopenia (CMS/HCC)   • Peripheral vascular disease (CMS/HCC)   • Spinal stenosis   • Urinary tract bacterial infections   • Small bowel obstruction (CMS/HCC)   • History of cholecystectomy   • Postprocedural state   • Rheumatoid arthritis (CMS/HCC)   • Incisional hernia   • Pain   • Need for vaccination   • Polypharmacy   • Disorder of lipid metabolism   • Memory loss   • Mixed hyperlipidemia   • Anticoagulated   • Paroxysmal atrial fibrillation (CMS/HCC)   • Atrial fibrillation (CMS/HCC)   • Bradycardia, drug induced   • New onset of congestive heart failure (CMS/HCC)   • Chronic kidney disease, stage 3 (CMS/HCC)   • Hypothyroidism due to medication   • Amiodarone toxicity           The following portions of the patient's history were reviewed and updated as appropriate: allergies, current medications, past family history, past medical history, past social history, past surgical history and problem list.    Current Outpatient Medications on File Prior to Visit   Medication Sig Dispense Refill   • amLODIPine (NORVASC) 10 MG tablet Take 10 mg by mouth Daily.     • ascorbic acid (VITAMIN C) 1000 MG tablet Take 1,000 mg by mouth Daily.     • aspirin 81 MG EC tablet Take 81 mg by mouth Daily.     • atenolol (TENORMIN) 25 MG tablet Take 1 tablet by mouth Daily. 30 tablet 11   • atorvastatin (LIPITOR) 40 MG tablet TAKE ONE TABLET BY MOUTH EVERY NIGHT AT BEDTIME     • cholecalciferol (VITAMIN D3) 25 MCG (1000 UT) tablet Take 1,000 Units by mouth Daily.     • citalopram (CeleXA) 20 MG tablet Take 20 mg by mouth Daily.     • diazePAM (VALIUM) 5 MG tablet Take 5 mg by mouth Every 6 (Six) Hours As Needed for Anxiety.     • ELIQUIS 5 MG tablet tablet Take 5 mg by mouth 2 (Two) Times a Day.     • hydroxychloroquine (PLAQUENIL) 200 MG tablet Take 200 mg by mouth Daily.     • loperamide (IMODIUM A-D) 2 MG tablet Take 2 mg by mouth 4  (Four) Times a Day As Needed for Diarrhea.     • losartan (COZAAR) 100 MG tablet Take 1 tablet by mouth Daily. 30 tablet 11   • melatonin 5 MG tablet tablet Take 5 mg by mouth Every Night.     • pantoprazole (PROTONIX) 40 MG EC tablet Take 40 mg by mouth Daily.     • QUEtiapine (SEROquel) 50 MG tablet Take 50 mg by mouth Every Night.     • Vortioxetine HBr 10 MG tablet Take 10 mg by mouth Daily.       No current facility-administered medications on file prior to visit.        Past Medical History:   Diagnosis Date   • Anxiety    • Arthritis    • Atrial fibrillation (CMS/[facility])    • Cerebral infarction (CMS/[facility])    • Chest pain    • Diabetes mellitus (CMS/[facility])     TYPE II   • Edema of lower extremity    • Elevated cholesterol    • GERD (gastroesophageal reflux disease)    • Hyperlipidemia    • Hypertension    • Hypothyroidism due to medication 1/12/2020   • IBS (irritable bowel syndrome)    • MVP (mitral valve prolapse)    • New onset of congestive heart failure (CMS/[facility]) 1/11/2020   • PVD (peripheral vascular disease) (CMS/[facility])    • Stroke (CMS/[facility])     has had 2, 22 yr ago and 06/16   • Venous thromboembolism        Past Surgical History:   Procedure Laterality Date   • AORTA - FEMORAL ARTERY BYPASS GRAFT  1995   • CARDIAC CATHETERIZATION     • CHOLECYSTECTOMY     • CHOLECYSTECTOMY     • COLONOSCOPY     • HEMORRHOIDECTOMY     • HERNIA REPAIR     • HYSTERECTOMY     • TOE AMPUTATION         Family History   Problem Relation Age of Onset   • Diabetes Mother    • Heart disease Mother    • Hypertension Mother    • Hyperlipidemia Mother    • COPD Mother    • Heart disease Father    • Hyperlipidemia Father    • Hypertension Father    • Stroke Father    • Diabetes Father        Social History     Socioeconomic History   • Marital status:      Spouse name: Not on file   • Number of children: Not on file   • Years of education: Not on file   • Highest education level: Not on file   Tobacco Use   • Smoking status: Former  "Smoker   • Smokeless tobacco: Never Used   Substance and Sexual Activity   • Alcohol use: No   • Drug use: No   • Sexual activity: Defer     Birth control/protection: Post-menopausal, Surgical       Review of Systems   Constitution: Positive for malaise/fatigue. Negative for diaphoresis.   HENT: Negative for nosebleeds and stridor.    Cardiovascular: Positive for dyspnea on exertion. Negative for chest pain, claudication, irregular heartbeat, leg swelling, near-syncope, orthopnea, palpitations, paroxysmal nocturnal dyspnea and syncope.   Respiratory: Positive for shortness of breath. Negative for cough, hemoptysis and wheezing.    Gastrointestinal: Negative for abdominal pain, constipation, diarrhea, hematemesis, hematochezia, melena, nausea and vomiting.   Neurological: Negative for dizziness, focal weakness, light-headedness and weakness.       OBJECTIVE:   Vital Signs  Vitals:    01/28/20 1519   BP: 117/56   Pulse: 54     Estimated body mass index is 21.97 kg/m² as calculated from the following:    Height as of this encounter: 162.6 cm (64\").    Weight as of this encounter: 58.1 kg (128 lb).    Physical Exam   Constitutional: She is oriented to person, place, and time. She appears well-developed and well-nourished. No distress.   HENT:   Head: Normocephalic and atraumatic.   Eyes: Pupils are equal, round, and reactive to light. Conjunctivae and EOM are normal.   Neck: Normal range of motion. Neck supple. No JVD present. No tracheal deviation present. No thyromegaly present.   Cardiovascular: Normal heart sounds and intact distal pulses. Exam reveals no gallop and no friction rub.   No murmur heard.  Pulmonary/Chest: Effort normal and breath sounds normal. No respiratory distress. She has no wheezes. She has no rales. She exhibits no tenderness.   Abdominal: Soft. Bowel sounds are normal. She exhibits no distension. There is no tenderness.   Musculoskeletal: Normal range of motion. She exhibits edema. She exhibits " no tenderness or deformity.   BLE 2+ pitting edema LLE-normal per baseline   Neurological: She is alert and oriented to person, place, and time.   Skin: Skin is warm and dry. No rash noted. She is not diaphoretic. No erythema. No pallor.   Psychiatric: She has a normal mood and affect. Her behavior is normal.   Nursing note and vitals reviewed.        ECG 12 Lead  Date/Time: 2020 3:40 PM  Performed by: Shreya Reyes APRN  Authorized by: Shreya Reyes APRN   Comparison: compared with previous ECG from 2020  Similar to previous ECG  Rhythm: sinus bradycardia  Rate: normal  QRS axis: normal  Other findings: T wave abnormality    Clinical impression: abnormal EKG            Stress Testin18  Interpretation Summary        · Findings consistent with an equivocal ECG stress test.  · Left ventricular ejection fraction is hyperdynamic (Calculated EF > 70%).  · Myocardial perfusion imaging indicates a normal myocardial perfusion study with no evidence of ischemia.  · Impressions are consistent with a low risk study.     Asymptomatic for chest pain. Specificity of study reduced secondary to baseline Non-specific ST-T wave abnormalities. Significant motion artifact due to unsteady gait, however ECG is not suggestive of ischemia  Ectopy: none.  Blood pressure response:  Hypertensive baseline and throughout; Baseline 155/65,Peak, 178/60 and Recovery 160//60  Pharmacologic study due to inability to tolerate increasing speed and grade of treadmill due to mobility  Issues and Beta-blocker therapy.  Participated in Low Level exercise and tolerance is poor due to very choppy gait.         Cardiac Echo:  20  Interpretation Summary     · Calculated EF = 68.0%.  · Left ventricular systolic function is normal.  · Left ventricular wall thickness is consistent with mild concentric hypertrophy.  · Left atrial cavity size is moderately dilated.  · There is calcification of the aortic valve.  · Aortic  valve area is 1.9 cm2.  · Aortic valve maximum pressure gradient is 13.1 mmHg.  · Aortic valve mean pressure gradient is 7.0 mmHg.  · Moderate mitral valve regurgitation is present  · Mild tricuspid valve regurgitation is present.  · Calculated right ventricular systolic pressure from tricuspid regurgitation is 40 mmHg.  · Estimated right ventricular systolic pressure from tricuspid regurgitation is mildly elevated (35-45 mmHg).           11/4/19  Interpretation Summary     · Mild mitral valve regurgitation is present  · Left atrial cavity size is moderately dilated.  · Mild tricuspid valve regurgitation is present.  · Calculated EF = 64.0%  · There is no evidence of pericardial effusion           ASSESSMENT:      Diagnosis Plan   1. Paroxysmal atrial fibrillation (CMS/HCC)  ECG 12 Lead   2. Mixed hyperlipidemia     3. Hypothyroidism due to medication     4. Amiodarone toxicity, accidental or unintentional, subsequent encounter           PLAN OF CARE:     1.  Amiodarone toxicity-recent admission as documented above.  Amiodarone discontinued.  ECG in office today reveals sinus rhythm with prolonged QTC now resolved, rate 54.  TSH continues to trend up, she has now been started on Synthroid by her PCP.      2.  Atrial fibrillation- ECG in office today reveals sinus rhythm, rate 54.  Patient currently on beta-blocker and anticoagulated on apixaban.  Continue Eliquis.    In the setting of atrial fibrillation, I have discussed with the patient/family/POA the risks and benefits of anticoagulation therapy which include increased risk of bleeding and decreased risk of systemic embolization such as stroke. Patient/family/POA verbalize understanding and agree with treatment plan.        3.  Hyperlipidemia- remains controlled, last lipid panel as dictated above.  Continue atorvastatin.    Risk of the hyperlipidemia, importance of the treatment has been explained  Pros and cons of the statins has been explained  Regular blood  workup as well as side effects including the liver failure, myelopathy death has been explained        4.  Hypertension-blood pressure in office today is currently stable and controlled.  Currently on amlodipine and atenolol as well as losartan.  Last echo and ischemic work-up as above.  Continue current medication regimen    Importance of controlling hypertension and blood pressure  checkup on the regular basis has been explained  Hypertension as a silent killer has been discussed  Risk reduction of the weight and regular exercises to control the hypertension has been explained      Patient scheduled to follow-up with MD in 3 weeks, keep this appointment.  Continue current medication regimen.      Sincerely,   DIANA Nixon  [facility]  01/28/20  4:05 PM

## 2020-02-05 ENCOUNTER — APPOINTMENT (OUTPATIENT)
Dept: CARDIOLOGY | Facility: HOSPITAL | Age: 75
End: 2020-02-05

## 2020-03-12 ENCOUNTER — OFFICE VISIT (OUTPATIENT)
Dept: CARDIOLOGY | Facility: CLINIC | Age: 75
End: 2020-03-12

## 2020-03-12 VITALS
DIASTOLIC BLOOD PRESSURE: 65 MMHG | BODY MASS INDEX: 21 KG/M2 | WEIGHT: 123 LBS | HEIGHT: 64 IN | HEART RATE: 49 BPM | SYSTOLIC BLOOD PRESSURE: 120 MMHG

## 2020-03-12 DIAGNOSIS — I48.0 PAROXYSMAL ATRIAL FIBRILLATION (HCC): ICD-10-CM

## 2020-03-12 DIAGNOSIS — T46.2X1A HYPOTHYROIDISM DUE TO AMIODARONE: Primary | ICD-10-CM

## 2020-03-12 DIAGNOSIS — I10 ESSENTIAL HYPERTENSION: ICD-10-CM

## 2020-03-12 DIAGNOSIS — E78.2 MIXED HYPERLIPIDEMIA: ICD-10-CM

## 2020-03-12 DIAGNOSIS — E03.2 HYPOTHYROIDISM DUE TO AMIODARONE: Primary | ICD-10-CM

## 2020-03-12 PROCEDURE — 99213 OFFICE O/P EST LOW 20 MIN: CPT | Performed by: INTERNAL MEDICINE

## 2020-03-12 NOTE — PROGRESS NOTES
Subjective:        Radha Alfred is a 74 y.o. female who here for follow up    CC  Amiodarone toxicity    bradycardia  HPI  74-year-old female here for the follow-up with known history of atrial fibrillation patient had developed amiodarone toxicity is off the amiodarone here for the follow-up with no complaints of chest pains or tightness in the chest     Problem List Items Addressed This Visit        Cardiovascular and Mediastinum    Hypertension    Mixed hyperlipidemia    Paroxysmal atrial fibrillation (CMS/HCC)      Other Visit Diagnoses     Hypothyroidism due to amiodarone    -  Primary        .    The following portions of the patient's history were reviewed and updated as appropriate: allergies, current medications, past family history, past medical history, past social history, past surgical history and problem list.    Past Medical History:   Diagnosis Date   • Anxiety    • Arthritis    • Atrial fibrillation (CMS/HCC)    • Cerebral infarction (CMS/HCC)    • Chest pain    • Diabetes mellitus (CMS/HCC)     TYPE II   • Edema of lower extremity    • Elevated cholesterol    • GERD (gastroesophageal reflux disease)    • Hyperlipidemia    • Hypertension    • Hypothyroidism due to medication 1/12/2020   • IBS (irritable bowel syndrome)    • MVP (mitral valve prolapse)    • New onset of congestive heart failure (CMS/HCC) 1/11/2020   • PVD (peripheral vascular disease) (CMS/HCC)    • Stroke (CMS/HCC)     has had 2, 22 yr ago and 06/16   • Venous thromboembolism      reports that she has quit smoking. She has never used smokeless tobacco. She reports that she does not drink alcohol or use drugs.   Family History   Problem Relation Age of Onset   • Diabetes Mother    • Heart disease Mother    • Hypertension Mother    • Hyperlipidemia Mother    • COPD Mother    • Heart disease Father    • Hyperlipidemia Father    • Hypertension Father    • Stroke Father    • Diabetes Father        Review of Systems  Constitutional: No  "wt loss, fever, fatigue  Gastrointestinal: No nausea, abdominal pain  Behavioral/Psych: No insomnia or anxiety   Cardiovascular no chest pains or tightness in the chest  Objective:       Physical Exam  /65 (BP Location: Left arm, Patient Position: Sitting)   Pulse (!) 49   Ht 162.6 cm (64\")   Wt 55.8 kg (123 lb)   BMI 21.11 kg/m²   General appearance: No acute changes   Neck: Trachea midline; NECK, supple, no thyromegaly or lymphadenopathy   Lungs: Normal size and shape, normal breath sounds, equal distribution of air, no rales and rhonchi   CV: S1-S2 regular, no murmurs, no rub, no gallop   Abdomen: Soft, non-tender; no masses , no abnormal abdominal sounds   Extremities: No deformity , normal color , no peripheral edema   Skin: Normal temperature, turgor and texture; no rash, ulcers          Procedures      Echocardiogram:        Current Outpatient Medications:   •  amLODIPine (NORVASC) 10 MG tablet, Take 10 mg by mouth Daily., Disp: , Rfl:   •  ascorbic acid (VITAMIN C) 1000 MG tablet, Take 1,000 mg by mouth Daily., Disp: , Rfl:   •  atenolol (TENORMIN) 25 MG tablet, Take 1 tablet by mouth Daily., Disp: 30 tablet, Rfl: 11  •  atorvastatin (LIPITOR) 40 MG tablet, TAKE ONE TABLET BY MOUTH EVERY NIGHT AT BEDTIME, Disp: , Rfl:   •  citalopram (CeleXA) 20 MG tablet, Take 20 mg by mouth Daily., Disp: , Rfl:   •  diazePAM (VALIUM) 5 MG tablet, Take 5 mg by mouth Every 6 (Six) Hours As Needed for Anxiety., Disp: , Rfl:   •  ELIQUIS 5 MG tablet tablet, Take 5 mg by mouth 2 (Two) Times a Day., Disp: , Rfl:   •  hydroxychloroquine (PLAQUENIL) 200 MG tablet, Take 200 mg by mouth Daily., Disp: , Rfl:   •  loperamide (IMODIUM A-D) 2 MG tablet, Take 2 mg by mouth 4 (Four) Times a Day As Needed for Diarrhea., Disp: , Rfl:   •  losartan (COZAAR) 100 MG tablet, Take 1 tablet by mouth Daily., Disp: 30 tablet, Rfl: 11  •  melatonin 5 MG tablet tablet, Take 5 mg by mouth Every Night., Disp: , Rfl:   •  pantoprazole " (PROTONIX) 40 MG EC tablet, Take 40 mg by mouth Daily., Disp: , Rfl:   •  QUEtiapine (SEROquel) 50 MG tablet, Take 50 mg by mouth Every Night., Disp: , Rfl:   •  Vortioxetine HBr 10 MG tablet, Take 10 mg by mouth Daily., Disp: , Rfl:    Assessment:        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)   • Type 2 diabetes mellitus (CMS/HCC)   • Encounter for screening for diabetes mellitus   • Hypertension   • Disorder of foot   • History of cardiac catheterization   • History of surgical procedure   • Displacement of lumbar intervertebral disc without myelopathy   • Heparin-induced thrombocytopenia (CMS/HCC)   • Peripheral vascular disease (CMS/HCC)   • Spinal stenosis   • Urinary tract bacterial infections   • Small bowel obstruction (CMS/HCC)   • History of cholecystectomy   • Postprocedural state   • Rheumatoid arthritis (CMS/HCC)   • Incisional hernia   • Pain   • Need for vaccination   • Polypharmacy   • Disorder of lipid metabolism   • Memory loss   • Mixed hyperlipidemia   • Anticoagulated   • Paroxysmal atrial fibrillation (CMS/HCC)   • Atrial fibrillation (CMS/HCC)   • Bradycardia, drug induced   • New onset of congestive heart failure (CMS/HCC)   • Chronic kidney disease, stage 3 (CMS/HCC)   • Hypothyroidism due to medication   • Amiodarone toxicity               Plan:            ICD-10-CM ICD-9-CM   1. Hypothyroidism due to amiodarone T46.2X1A 244.3    E03.2    2. Paroxysmal atrial fibrillation (CMS/HCC) I48.0 427.31   3. Mixed hyperlipidemia E78.2 272.2   4. Essential hypertension I10 401.9     1. Hypothyroidism due to amiodarone  Of the amiodarone    2. Paroxysmal atrial fibrillation (CMS/HCC)  Now in normal sinus rhythm    3. Mixed  hyperlipidemia  Continue current treatment    4. Essential hypertension  Blood pressure under control    5.  Bradycardia  May need pacemaker    Bradycardia with fatigue    Will see in 3 months if still low hr will need pacemaker  COUNSELING:    Radha Delgado was given to patient for the following topics: diagnostic results, risk factor reductions, impressions, risks and benefits of treatment options and importance of treatment compliance .       SMOKING COUNSELING:    [unfilled]    Dictated using Dragon dictation

## 2020-07-09 RX ORDER — AMLODIPINE BESYLATE 10 MG/1
10 TABLET ORAL DAILY
Qty: 30 TABLET | Refills: 11 | Status: SHIPPED | OUTPATIENT
Start: 2020-07-09

## 2020-09-30 RX ORDER — LOSARTAN POTASSIUM 100 MG/1
100 TABLET ORAL
Qty: 30 TABLET | Refills: 5 | Status: SHIPPED | OUTPATIENT
Start: 2020-09-30 | End: 2021-07-07

## 2021-01-08 RX ORDER — ATENOLOL 25 MG/1
25 TABLET ORAL DAILY
Qty: 30 TABLET | Refills: 3 | Status: SHIPPED | OUTPATIENT
Start: 2021-01-08

## 2021-03-02 DIAGNOSIS — Z23 IMMUNIZATION DUE: ICD-10-CM

## 2021-06-09 ENCOUNTER — TELEPHONE (OUTPATIENT)
Dept: CARDIOLOGY | Facility: CLINIC | Age: 76
End: 2021-06-09

## 2021-07-07 RX ORDER — LOSARTAN POTASSIUM 100 MG/1
100 TABLET ORAL
Qty: 30 TABLET | Refills: 0 | Status: SHIPPED | OUTPATIENT
Start: 2021-07-07

## 2023-12-31 ENCOUNTER — APPOINTMENT (OUTPATIENT)
Dept: CT IMAGING | Facility: HOSPITAL | Age: 78
End: 2023-12-31
Payer: MEDICARE

## 2023-12-31 ENCOUNTER — HOSPITAL ENCOUNTER (EMERGENCY)
Facility: HOSPITAL | Age: 78
Discharge: ANOTHER HOSPITAL | End: 2024-01-01
Attending: EMERGENCY MEDICINE | Admitting: STUDENT IN AN ORGANIZED HEALTH CARE EDUCATION/TRAINING PROGRAM
Payer: MEDICARE

## 2023-12-31 ENCOUNTER — APPOINTMENT (OUTPATIENT)
Dept: GENERAL RADIOLOGY | Facility: HOSPITAL | Age: 78
End: 2023-12-31
Payer: MEDICARE

## 2023-12-31 DIAGNOSIS — R41.82 ALTERED MENTAL STATUS, UNSPECIFIED ALTERED MENTAL STATUS TYPE: ICD-10-CM

## 2023-12-31 DIAGNOSIS — R93.89 ABNORMAL CT OF THE CHEST: ICD-10-CM

## 2023-12-31 DIAGNOSIS — J18.9 PNEUMONIA OF BOTH LUNGS DUE TO INFECTIOUS ORGANISM, UNSPECIFIED PART OF LUNG: ICD-10-CM

## 2023-12-31 DIAGNOSIS — I50.9 ACUTE ON CHRONIC CONGESTIVE HEART FAILURE, UNSPECIFIED HEART FAILURE TYPE: Primary | ICD-10-CM

## 2023-12-31 DIAGNOSIS — J90 PLEURAL EFFUSION: ICD-10-CM

## 2023-12-31 DIAGNOSIS — R06.02 SHORTNESS OF BREATH: ICD-10-CM

## 2023-12-31 LAB
ALBUMIN SERPL-MCNC: 4.1 G/DL (ref 3.5–5.2)
ALBUMIN/GLOB SERPL: 1.9 G/DL
ALP SERPL-CCNC: 85 U/L (ref 39–117)
ALT SERPL W P-5'-P-CCNC: 13 U/L (ref 1–33)
ANION GAP SERPL CALCULATED.3IONS-SCNC: 11.3 MMOL/L (ref 5–15)
AST SERPL-CCNC: 16 U/L (ref 1–32)
BACTERIA UR QL AUTO: ABNORMAL /HPF
BASOPHILS # BLD AUTO: 0.04 10*3/MM3 (ref 0–0.2)
BASOPHILS NFR BLD AUTO: 0.7 % (ref 0–1.5)
BILIRUB SERPL-MCNC: 1.1 MG/DL (ref 0–1.2)
BILIRUB UR QL STRIP: NEGATIVE
BUN SERPL-MCNC: 13 MG/DL (ref 8–23)
BUN/CREAT SERPL: 10.1 (ref 7–25)
CALCIUM SPEC-SCNC: 8.4 MG/DL (ref 8.6–10.5)
CHLORIDE SERPL-SCNC: 101 MMOL/L (ref 98–107)
CLARITY UR: CLEAR
CO2 SERPL-SCNC: 24.7 MMOL/L (ref 22–29)
COLOR UR: YELLOW
CREAT SERPL-MCNC: 1.29 MG/DL (ref 0.57–1)
DEPRECATED RDW RBC AUTO: 54.6 FL (ref 37–54)
EGFRCR SERPLBLD CKD-EPI 2021: 42.3 ML/MIN/1.73
EOSINOPHIL # BLD AUTO: 0.03 10*3/MM3 (ref 0–0.4)
EOSINOPHIL NFR BLD AUTO: 0.5 % (ref 0.3–6.2)
ERYTHROCYTE [DISTWIDTH] IN BLOOD BY AUTOMATED COUNT: 16 % (ref 12.3–15.4)
FLUAV SUBTYP SPEC NAA+PROBE: NOT DETECTED
FLUBV RNA ISLT QL NAA+PROBE: NOT DETECTED
GLOBULIN UR ELPH-MCNC: 2.2 GM/DL
GLUCOSE SERPL-MCNC: 106 MG/DL (ref 65–99)
GLUCOSE UR STRIP-MCNC: NEGATIVE MG/DL
HCT VFR BLD AUTO: 33.8 % (ref 34–46.6)
HGB BLD-MCNC: 10.5 G/DL (ref 12–15.9)
HGB UR QL STRIP.AUTO: ABNORMAL
HYALINE CASTS UR QL AUTO: ABNORMAL /LPF
IMM GRANULOCYTES # BLD AUTO: 0.02 10*3/MM3 (ref 0–0.05)
IMM GRANULOCYTES NFR BLD AUTO: 0.3 % (ref 0–0.5)
KETONES UR QL STRIP: NEGATIVE
LEUKOCYTE ESTERASE UR QL STRIP.AUTO: NEGATIVE
LYMPHOCYTES # BLD AUTO: 0.92 10*3/MM3 (ref 0.7–3.1)
LYMPHOCYTES NFR BLD AUTO: 15.2 % (ref 19.6–45.3)
MCH RBC QN AUTO: 28.9 PG (ref 26.6–33)
MCHC RBC AUTO-ENTMCNC: 31.1 G/DL (ref 31.5–35.7)
MCV RBC AUTO: 93.1 FL (ref 79–97)
MONOCYTES # BLD AUTO: 0.52 10*3/MM3 (ref 0.1–0.9)
MONOCYTES NFR BLD AUTO: 8.6 % (ref 5–12)
NEUTROPHILS NFR BLD AUTO: 4.51 10*3/MM3 (ref 1.7–7)
NEUTROPHILS NFR BLD AUTO: 74.7 % (ref 42.7–76)
NITRITE UR QL STRIP: NEGATIVE
PH UR STRIP.AUTO: 7 [PH] (ref 5–8)
PLATELET # BLD AUTO: 170 10*3/MM3 (ref 140–450)
PMV BLD AUTO: 9.8 FL (ref 6–12)
POTASSIUM SERPL-SCNC: 3.9 MMOL/L (ref 3.5–5.2)
PROT SERPL-MCNC: 6.3 G/DL (ref 6–8.5)
PROT UR QL STRIP: ABNORMAL
RBC # BLD AUTO: 3.63 10*6/MM3 (ref 3.77–5.28)
RBC # UR STRIP: ABNORMAL /HPF
REF LAB TEST METHOD: ABNORMAL
RSV RNA NPH QL NAA+NON-PROBE: NOT DETECTED
SARS-COV-2 RNA RESP QL NAA+PROBE: NOT DETECTED
SODIUM SERPL-SCNC: 137 MMOL/L (ref 136–145)
SP GR UR STRIP: 1.02 (ref 1–1.03)
SQUAMOUS #/AREA URNS HPF: ABNORMAL /HPF
STREP A PCR: NOT DETECTED
UROBILINOGEN UR QL STRIP: ABNORMAL
WBC # UR STRIP: ABNORMAL /HPF
WBC NRBC COR # BLD AUTO: 6.04 10*3/MM3 (ref 3.4–10.8)

## 2023-12-31 PROCEDURE — 70450 CT HEAD/BRAIN W/O DYE: CPT

## 2023-12-31 PROCEDURE — 85025 COMPLETE CBC W/AUTO DIFF WBC: CPT | Performed by: EMERGENCY MEDICINE

## 2023-12-31 PROCEDURE — 87086 URINE CULTURE/COLONY COUNT: CPT | Performed by: EMERGENCY MEDICINE

## 2023-12-31 PROCEDURE — 80053 COMPREHEN METABOLIC PANEL: CPT | Performed by: EMERGENCY MEDICINE

## 2023-12-31 PROCEDURE — 96361 HYDRATE IV INFUSION ADD-ON: CPT

## 2023-12-31 PROCEDURE — 87636 SARSCOV2 & INF A&B AMP PRB: CPT | Performed by: EMERGENCY MEDICINE

## 2023-12-31 PROCEDURE — 71250 CT THORAX DX C-: CPT

## 2023-12-31 PROCEDURE — 93005 ELECTROCARDIOGRAM TRACING: CPT | Performed by: EMERGENCY MEDICINE

## 2023-12-31 PROCEDURE — 84484 ASSAY OF TROPONIN QUANT: CPT | Performed by: STUDENT IN AN ORGANIZED HEALTH CARE EDUCATION/TRAINING PROGRAM

## 2023-12-31 PROCEDURE — 71046 X-RAY EXAM CHEST 2 VIEWS: CPT

## 2023-12-31 PROCEDURE — 96375 TX/PRO/DX INJ NEW DRUG ADDON: CPT

## 2023-12-31 PROCEDURE — 87634 RSV DNA/RNA AMP PROBE: CPT | Performed by: EMERGENCY MEDICINE

## 2023-12-31 PROCEDURE — 87651 STREP A DNA AMP PROBE: CPT | Performed by: EMERGENCY MEDICINE

## 2023-12-31 PROCEDURE — 25810000003 SODIUM CHLORIDE 0.9 % SOLUTION: Performed by: EMERGENCY MEDICINE

## 2023-12-31 PROCEDURE — 83880 ASSAY OF NATRIURETIC PEPTIDE: CPT | Performed by: STUDENT IN AN ORGANIZED HEALTH CARE EDUCATION/TRAINING PROGRAM

## 2023-12-31 PROCEDURE — 25010000002 HYDRALAZINE PER 20 MG: Performed by: STUDENT IN AN ORGANIZED HEALTH CARE EDUCATION/TRAINING PROGRAM

## 2023-12-31 PROCEDURE — 81001 URINALYSIS AUTO W/SCOPE: CPT | Performed by: EMERGENCY MEDICINE

## 2023-12-31 PROCEDURE — 99284 EMERGENCY DEPT VISIT MOD MDM: CPT

## 2023-12-31 RX ORDER — HYDRALAZINE HYDROCHLORIDE 20 MG/ML
10 INJECTION INTRAMUSCULAR; INTRAVENOUS ONCE
Status: COMPLETED | OUTPATIENT
Start: 2023-12-31 | End: 2023-12-31

## 2023-12-31 RX ADMIN — SODIUM CHLORIDE 1000 ML: 9 INJECTION, SOLUTION INTRAVENOUS at 21:08

## 2023-12-31 RX ADMIN — HYDRALAZINE HYDROCHLORIDE 10 MG: 20 INJECTION, SOLUTION INTRAMUSCULAR; INTRAVENOUS at 21:10

## 2023-12-31 NOTE — [facility] PROVIDER NOTE
Subjective   History of Present Illness  78-year-old female lives with her daughter who works during the day.  She fell Wednesday hit her forehead and there is a bruise from that but they said she was fine for the next 2 days then she developed confusion and they are not sure of the cause.  The confusion arose by noting that she had trouble using the remote or her iPhone which she is usually very sharp on and knows it better with everybody else in the family.  She seems less herself but she ambulates and answers questions.  He has a known history of not eating or drinking enough fluid or eating enough nutrition.  There is a possibility she might be dehydrated or mildly malnourished.  No known fever or cough or stiff or sore neck.  A mild sore throat for 2 days.  No nasal congestion or discharge.  No vomiting or diarrhea or abdominal pain or chest pain or back pain.  No pain or burning when she urinates and no frequent urination.  No rash.        Review of Systems    Past Medical History:   Diagnosis Date    Anxiety     Arthritis     Atrial fibrillation     Cerebral infarction     Chest pain     Diabetes mellitus     TYPE II    Edema of lower extremity     Elevated cholesterol     GERD (gastroesophageal reflux disease)     Hyperlipidemia     Hypertension     Hypothyroidism due to medication 1/12/2020    IBS (irritable bowel syndrome)     MVP (mitral valve prolapse)     New onset of congestive heart failure 1/11/2020    PVD (peripheral vascular disease)     Stroke     has had 2, 22 yr ago and 06/16    Venous thromboembolism        Allergies   Allergen Reactions    Amoxicillin Diarrhea    Contrast Dye (Echo Or Unknown Ct/Mr) Other (See Comments)     CAUSED ELEV BP    Heparin Other (See Comments)     CAUSED MORE CLOTTING AFTER AORTA MIO OJEDA    Iodinated Contrast Media Other (See Comments)     Other reaction(s): Other (See Comments)  CAUSED ELEV BP  CAUSED ELEV BP  CAUSED ELEV BP  CAUSED ELEV BP    Erythromycin  Diarrhea    Meperidine GI Intolerance     N/V    Sulfa Antibiotics Diarrhea     N/V    Amiodarone Unknown - High Severity     TOXIC    Baclofen Other (See Comments)     Altered mental status       Past Surgical History:   Procedure Laterality Date    AORTA - FEMORAL ARTERY BYPASS GRAFT  1995    CARDIAC CATHETERIZATION      CHOLECYSTECTOMY      CHOLECYSTECTOMY      COLONOSCOPY      HEMORRHOIDECTOMY      HERNIA REPAIR      HYSTERECTOMY      TOE AMPUTATION         Family History   Problem Relation Age of Onset    Diabetes Mother     Heart disease Mother     Hypertension Mother     Hyperlipidemia Mother     COPD Mother     Heart disease Father     Hyperlipidemia Father     Hypertension Father     Stroke Father     Diabetes Father        Social History     Socioeconomic History    Marital status:    Tobacco Use    Smoking status: Former    Smokeless tobacco: Never   Substance and Sexual Activity    Alcohol use: No    Drug use: No    Sexual activity: Defer     Birth control/protection: Post-menopausal, Surgical           Objective   Physical Exam  Vitals and nursing note reviewed.   Constitutional:       Appearance: She is well-developed.   Eyes:      Extraocular Movements: Extraocular movements intact.      Pupils: Pupils are equal, round, and reactive to light.   Cardiovascular:      Rate and Rhythm: Tachycardia present. Rhythm irregular.      Heart sounds: Normal heart sounds. No murmur heard.     No friction rub.   Pulmonary:      Effort: Pulmonary effort is normal. No respiratory distress.      Breath sounds: Normal breath sounds. No stridor. No wheezing, rhonchi or rales.   Abdominal:      General: Bowel sounds are normal. There is no distension.      Palpations: Abdomen is soft.      Tenderness: There is no abdominal tenderness.   Musculoskeletal:         General: No swelling or tenderness. Normal range of motion.      Cervical back: Normal range of motion and neck supple. No rigidity.   Lymphadenopathy:       Cervical: No cervical adenopathy.   Skin:     General: Skin is warm and dry.      Capillary Refill: Capillary refill takes less than 2 seconds.      Coloration: Skin is not cyanotic or pale.      Findings: No erythema or rash.      Comments: Mild ecchymosis over the left forehead no other findings.   Neurological:      Mental Status: She is alert and oriented to person, place, and time.      GCS: GCS eye subscore is 4. GCS verbal subscore is 5. GCS motor subscore is 6.      Cranial Nerves: No cranial nerve deficit, dysarthria or facial asymmetry.      Sensory: No sensory deficit.      Motor: Weakness present.      Comments: Generalized weakness but no focal weakness   Psychiatric:         Mood and Affect: Mood normal. Mood is not anxious or depressed.         Speech: Speech normal.         Behavior: Behavior normal. Behavior is not agitated.         Cognition and Memory: Cognition is not impaired.         Procedures           ED Course  ED Course as of 01/01/24 0150   Sun Dec 31, 2023   1846 S/o to Dr Anthony at 7 pm re HPI exam work up pending, EKG findings, an office visit noted afib in 2022 [AR]   1846 PMH of DM HTN stage 3b CKD CAD PVD OA afib noted on 5/22 [AR]   Mon Jan 01, 2024   0126 Signout taken from previous ED physician.  In short, this is a patient who presented to the emergency department with 1 week of progressively worsening altered mental status.  Patient normally does her ADLs individually without any difficulties.  Patient does not have any chest pain.  Patient does get short of breath when she gets up to get to the bathroom.  X-ray noted to be abnormal.  CT was ordered.  Patient noted to have oral effusions, fluid in the lungs, possibly an infiltrate, as well as fluid around her aorta.  Patient does not exhibit any signs of aortic rupture.  No chest pain or pain in her back.  Patient was initially hypertensive, but I provided her with hydralazine which has resolved this issue.  I have also added  on a troponin and a BNP.  Troponin is elevated to 60, but has been elevated on previous troponins as well.  There is been difficulty regarding the patient's IV access.  Multiple attempts were made by every nurse in the department..  Patient was given Rocephin, but was unable to get the azithromycin before the IV blew. This was discussed with the receiving facility by the RN. Unfortunately, we do not have p.o. Lasix here at this facility.  Patient prefers to go to [facility].  This has been arranged have spoken with Dr. Blake.  Patient has been admitted for transfer.  [JS]   0148 EMS was initially called for the patient.  Patient and family prefer to go by private vehicle.  After discussing risk and benefits, patient has decided she will go by private vehicle.  Patient expressed understanding of the risk and benefits of this. [JS]      ED Course User Index  [AR] Lucinda Rodríguez MD  [JS] Kirk Bearden MD                                           Medical Decision Making  Differential diagnosis includes but not limited to for weakness complaint includes UTI, pneumonia, COVID flu strep RSV, dehydration, low potassium and other electrolyte issues.    Problems Addressed:  Abnormal CT of the chest: complicated acute illness or injury  Acute on chronic congestive heart failure, unspecified heart failure type: complicated acute illness or injury  Altered mental status, unspecified altered mental status type: complicated acute illness or injury  Pleural effusion: complicated acute illness or injury  Pneumonia of both lungs due to infectious organism, unspecified part of lung: complicated acute illness or injury  Shortness of breath: complicated acute illness or injury    Amount and/or Complexity of Data Reviewed  Labs: ordered. Decision-making details documented in ED Course.  Radiology: ordered and independent interpretation performed.  ECG/medicine tests: ordered and independent interpretation performed.     Details:  EKG shows A-fib rate of 79 with PVCs QTc is 520 greater than 500.;  2 previous EKGs looked up in 2021 is 3/12/2020 and 1 is 1/11/2020 and both are normal sinus rhythm with rates in the 60s.  Without PVCs. Office visit in 2022 noted afib at that time    Risk  Prescription drug management.        Final diagnoses:   Pneumonia of both lungs due to infectious organism, unspecified part of lung   Pleural effusion   Altered mental status, unspecified altered mental status type   Shortness of breath   Abnormal CT of the chest   Acute on chronic congestive heart failure, unspecified heart failure type       ED Disposition  ED Disposition       ED Disposition   Transfer to Another Facility     Condition   --    Comment   --               No follow-up provider specified.       Medication List      No changes were made to your prescriptions during this visit.

## 2024-01-01 VITALS
OXYGEN SATURATION: 98 % | TEMPERATURE: 98 F | HEART RATE: 80 BPM | RESPIRATION RATE: 20 BRPM | WEIGHT: 123.02 LBS | BODY MASS INDEX: 21 KG/M2 | HEIGHT: 64 IN | SYSTOLIC BLOOD PRESSURE: 178 MMHG | DIASTOLIC BLOOD PRESSURE: 77 MMHG

## 2024-01-01 LAB
BACTERIA SPEC AEROBE CULT: NO GROWTH
GEN 5 2HR TROPONIN T REFLEX: 53 NG/L
NT-PROBNP SERPL-MCNC: ABNORMAL PG/ML (ref 0–1800)
QT INTERVAL: 453 MS
QTC INTERVAL: 520 MS
TROPONIN T DELTA: -7 NG/L
TROPONIN T SERPL HS-MCNC: 60 NG/L

## 2024-01-01 PROCEDURE — 84484 ASSAY OF TROPONIN QUANT: CPT | Performed by: STUDENT IN AN ORGANIZED HEALTH CARE EDUCATION/TRAINING PROGRAM

## 2024-01-01 PROCEDURE — 25010000002 CEFTRIAXONE PER 250 MG: Performed by: STUDENT IN AN ORGANIZED HEALTH CARE EDUCATION/TRAINING PROGRAM

## 2024-01-01 PROCEDURE — 96365 THER/PROPH/DIAG IV INF INIT: CPT

## 2024-01-01 PROCEDURE — 36415 COLL VENOUS BLD VENIPUNCTURE: CPT

## 2024-01-01 RX ORDER — AZITHROMYCIN 250 MG/1
500 TABLET, FILM COATED ORAL ONCE
Status: DISCONTINUED | OUTPATIENT
Start: 2024-01-01 | End: 2024-01-01

## 2024-01-01 RX ADMIN — CEFTRIAXONE SODIUM 1000 MG: 1 INJECTION, POWDER, FOR SOLUTION INTRAMUSCULAR; INTRAVENOUS at 00:24

## 2024-01-01 NOTE — ED NOTES
IV to Right AC clotted after Rocephin. Unable to obtain access. Pt is going  by POV to [facility]. Azithromycin IV not given d/t no  IV access. Zithromax po considered but d/t  excessive stomach up set it was not given.

## 2024-01-01 NOTE — ED NOTES
Multiple IV sticks w/o success.  On 5th attempt #24 g placed in right IV B/P  200/100 taken manually right arm, then left arm 198/110. Repeat B/p automatic 199/96. Reported to Dr Bearden, order received.

## 2024-12-04 ENCOUNTER — INPATIENT HOSPITAL (OUTPATIENT)
Age: 79
End: 2024-12-04
Payer: MEDICARE

## 2024-12-04 ENCOUNTER — INPATIENT HOSPITAL (OUTPATIENT)
Dept: URBAN - METROPOLITAN AREA HOSPITAL 107 | Facility: HOSPITAL | Age: 79
End: 2024-12-04
Payer: MEDICARE

## 2024-12-04 DIAGNOSIS — K21.9 GASTRO-ESOPHAGEAL REFLUX DISEASE WITHOUT ESOPHAGITIS: ICD-10-CM

## 2024-12-04 DIAGNOSIS — Z79.01 LONG TERM (CURRENT) USE OF ANTICOAGULANTS: ICD-10-CM

## 2024-12-04 DIAGNOSIS — R19.5 OTHER FECAL ABNORMALITIES: ICD-10-CM

## 2024-12-04 DIAGNOSIS — D64.9 ANEMIA, UNSPECIFIED: ICD-10-CM

## 2024-12-04 DIAGNOSIS — K58.0 IRRITABLE BOWEL SYNDROME WITH DIARRHEA: ICD-10-CM

## 2024-12-04 PROCEDURE — 99223 1ST HOSP IP/OBS HIGH 75: CPT | Performed by: INTERNAL MEDICINE

## 2024-12-05 ENCOUNTER — INPATIENT HOSPITAL (OUTPATIENT)
Dept: URBAN - METROPOLITAN AREA HOSPITAL 107 | Facility: HOSPITAL | Age: 79
End: 2024-12-05
Payer: MEDICARE

## 2024-12-05 ENCOUNTER — INPATIENT HOSPITAL (OUTPATIENT)
Age: 79
End: 2024-12-05
Payer: MEDICARE

## 2024-12-05 DIAGNOSIS — K31.7 POLYP OF STOMACH AND DUODENUM: ICD-10-CM

## 2024-12-05 DIAGNOSIS — K31.89 OTHER DISEASES OF STOMACH AND DUODENUM: ICD-10-CM

## 2024-12-05 DIAGNOSIS — K29.50 UNSPECIFIED CHRONIC GASTRITIS WITHOUT BLEEDING: ICD-10-CM

## 2024-12-05 DIAGNOSIS — D64.9 ANEMIA, UNSPECIFIED: ICD-10-CM

## 2024-12-05 DIAGNOSIS — R19.5 OTHER FECAL ABNORMALITIES: ICD-10-CM

## 2024-12-05 PROCEDURE — 43239 EGD BIOPSY SINGLE/MULTIPLE: CPT | Performed by: INTERNAL MEDICINE

## 2024-12-06 ENCOUNTER — INPATIENT HOSPITAL (OUTPATIENT)
Dept: URBAN - METROPOLITAN AREA HOSPITAL 107 | Facility: HOSPITAL | Age: 79
End: 2024-12-06
Payer: MEDICARE

## 2024-12-06 ENCOUNTER — INPATIENT HOSPITAL (OUTPATIENT)
Age: 79
End: 2024-12-06
Payer: MEDICARE

## 2024-12-06 DIAGNOSIS — R19.5 OTHER FECAL ABNORMALITIES: ICD-10-CM

## 2024-12-06 DIAGNOSIS — D64.9 ANEMIA, UNSPECIFIED: ICD-10-CM

## 2024-12-06 PROCEDURE — 99232 SBSQ HOSP IP/OBS MODERATE 35: CPT | Performed by: PHYSICIAN ASSISTANT
